# Patient Record
Sex: FEMALE | Race: WHITE | HISPANIC OR LATINO | Employment: FULL TIME | URBAN - METROPOLITAN AREA
[De-identification: names, ages, dates, MRNs, and addresses within clinical notes are randomized per-mention and may not be internally consistent; named-entity substitution may affect disease eponyms.]

---

## 2017-04-25 LAB
EXTERNAL HIV CONFIRMATION: NORMAL
EXTERNAL HIV SCREEN: NORMAL
HCV AB SER-ACNC: NEGATIVE

## 2018-11-17 ENCOUNTER — APPOINTMENT (EMERGENCY)
Dept: RADIOLOGY | Facility: HOSPITAL | Age: 28
End: 2018-11-17
Payer: COMMERCIAL

## 2018-11-17 ENCOUNTER — HOSPITAL ENCOUNTER (EMERGENCY)
Facility: HOSPITAL | Age: 28
Discharge: HOME | End: 2018-11-17
Attending: EMERGENCY MEDICINE | Admitting: EMERGENCY MEDICINE
Payer: COMMERCIAL

## 2018-11-17 VITALS
TEMPERATURE: 97.5 F | SYSTOLIC BLOOD PRESSURE: 118 MMHG | DIASTOLIC BLOOD PRESSURE: 70 MMHG | WEIGHT: 146 LBS | OXYGEN SATURATION: 97 % | HEART RATE: 78 BPM | RESPIRATION RATE: 18 BRPM

## 2018-11-17 DIAGNOSIS — J40 BRONCHITIS: Primary | ICD-10-CM

## 2018-11-17 PROCEDURE — 71046 X-RAY EXAM CHEST 2 VIEWS: CPT

## 2018-11-17 PROCEDURE — 99285 EMERGENCY DEPT VISIT HI MDM: CPT

## 2018-11-17 PROCEDURE — 94640 AIRWAY INHALATION TREATMENT: CPT

## 2018-11-17 RX ORDER — ALBUTEROL SULFATE 90 UG/1
2 AEROSOL, METERED RESPIRATORY (INHALATION) EVERY 4 HOURS PRN
Qty: 1 INHALER | Refills: 0 | Status: SHIPPED | OUTPATIENT
Start: 2018-11-17 | End: 2019-03-29

## 2018-11-17 RX ORDER — ALBUTEROL SULFATE 2.5 MG/3ML
SOLUTION RESPIRATORY (INHALATION)
Status: COMPLETED
Start: 2018-11-17 | End: 2018-11-17

## 2018-11-17 RX ORDER — PREDNISONE 10 MG/1
TABLET ORAL
Qty: 27 TABLET | Refills: 0 | Status: SHIPPED | OUTPATIENT
Start: 2018-11-17 | End: 2019-03-29

## 2018-11-17 RX ORDER — CETIRIZINE HYDROCHLORIDE 10 MG/1
10 TABLET ORAL DAILY
Qty: 30 TABLET | Refills: 0 | Status: SHIPPED | OUTPATIENT
Start: 2018-11-17 | End: 2019-03-29 | Stop reason: SDUPTHER

## 2018-11-17 RX ORDER — ALBUTEROL SULFATE 2.5 MG/3ML
5 SOLUTION RESPIRATORY (INHALATION) ONCE
Status: COMPLETED | OUTPATIENT
Start: 2018-11-17 | End: 2018-11-17

## 2018-11-17 RX ORDER — ALBUTEROL SULFATE 90 UG/1
2 AEROSOL, METERED RESPIRATORY (INHALATION) EVERY 6 HOURS PRN
COMMUNITY
End: 2019-03-29

## 2018-11-17 RX ORDER — PREDNISONE 10 MG/1
5 TABLET ORAL DAILY
COMMUNITY
End: 2018-11-17

## 2018-11-17 RX ORDER — FLUTICASONE PROPIONATE 50 MCG
1 SPRAY, SUSPENSION (ML) NASAL DAILY
Qty: 16 G | Refills: 0 | Status: SHIPPED | OUTPATIENT
Start: 2018-11-17 | End: 2019-07-18

## 2018-11-17 RX ADMIN — ALBUTEROL SULFATE 5 MG: 2.5 SOLUTION RESPIRATORY (INHALATION) at 10:45

## 2018-11-17 RX ADMIN — IPRATROPIUM BROMIDE 0.5 MG: 0.5 SOLUTION RESPIRATORY (INHALATION) at 10:45

## 2018-11-17 RX ADMIN — Medication 0.5 MG: at 10:45

## 2018-11-17 RX ADMIN — PREDNISONE 50 MG: 20 TABLET ORAL at 11:22

## 2018-11-17 NOTE — ED PROVIDER NOTES
History  Chief Complaint   Patient presents with    Shortness of Breath     cough x 1 month, treated for bronchitis and sob since this morning  Denies cp      26 yo female first started with cough, wheezing, URI symptoms about 1 month ago, evaluated at [facility] near onset, treated with abx, steroids, benzonatate, and had 3 week period of interval improvement, then exacerbated again at work yesterday, which is a typical trigger due to jorge a environment where she does wear a mask at times, but not all the time  She has not been previously diagnosed with asthma  History provided by:  Patient  URI   Presenting symptoms: cough    Presenting symptoms: no fever and no sore throat    Severity:  Moderate  Onset quality:  Sudden  Duration:  2 days  Timing:  Sporadic  Progression:  Worsening  Chronicity:  Recurrent  Relieved by:  Nebulizer treatments  Worsened by:  Breathing  Ineffective treatments:  OTC medications  Associated symptoms: wheezing    Associated symptoms: no headaches and no neck pain    Risk factors: no sick contacts        Prior to Admission Medications   Prescriptions Last Dose Informant Patient Reported? Taking? albuterol (PROVENTIL HFA,VENTOLIN HFA) 90 mcg/act inhaler   Yes Yes   Sig: Inhale 2 puffs every 6 (six) hours as needed for wheezing   predniSONE 10 mg tablet   Yes Yes   Sig: Take 5 mg by mouth daily      Facility-Administered Medications: None       History reviewed  No pertinent past medical history  History reviewed  No pertinent surgical history  History reviewed  No pertinent family history  I have reviewed and agree with the history as documented  Social History   Substance Use Topics    Smoking status: Never Smoker    Smokeless tobacco: Never Used    Alcohol use Yes      Comment: social        Review of Systems   Constitutional: Negative for appetite change, chills and fever  HENT: Negative for sore throat      Respiratory: Positive for cough and wheezing  Negative for shortness of breath  Cardiovascular: Negative for chest pain and palpitations  Gastrointestinal: Negative for abdominal pain, diarrhea, nausea and vomiting  Genitourinary: Negative for dysuria and hematuria  Musculoskeletal: Negative for neck pain  Skin: Negative for rash  Neurological: Negative for dizziness, weakness and headaches  Psychiatric/Behavioral: Negative for suicidal ideas  All other systems reviewed and are negative  Physical Exam  Physical Exam   Constitutional: She is oriented to person, place, and time  Vital signs are normal  She appears well-developed and well-nourished  Non-toxic appearance  First nurse protocol gave updraft treatment and she reports improvement   HENT:   Head: Normocephalic and atraumatic  Right Ear: Tympanic membrane and external ear normal    Left Ear: Tympanic membrane and external ear normal    Nose: Nose normal    Mouth/Throat: Oropharynx is clear and moist    Eyes: Pupils are equal, round, and reactive to light  Conjunctivae and EOM are normal    Neck: Normal range of motion and full passive range of motion without pain  Neck supple  No Brudzinski's sign and no Kernig's sign noted  Cardiovascular: Normal rate, regular rhythm, normal heart sounds, intact distal pulses and normal pulses  No murmur heard  Pulmonary/Chest: Effort normal and breath sounds normal  No tachypnea  No respiratory distress  She has no wheezes  Abdominal: Soft  Bowel sounds are normal  She exhibits no distension  There is no tenderness  There is no rigidity, no rebound and no guarding  Musculoskeletal: Normal range of motion  Right lower leg: She exhibits no swelling  Left lower leg: She exhibits no swelling  Lymphadenopathy:     She has no cervical adenopathy  Neurological: She is alert and oriented to person, place, and time  She has normal strength and normal reflexes  No cranial nerve deficit or sensory deficit  Coordination and gait normal  GCS eye subscore is 4  GCS verbal subscore is 5  GCS motor subscore is 6  Skin: Skin is warm and dry  No rash noted  She is not diaphoretic  No pallor  Psychiatric: She has a normal mood and affect  Her speech is normal and behavior is normal  Judgment and thought content normal  Cognition and memory are normal    Nursing note and vitals reviewed        Vital Signs  ED Triage Vitals   Temperature Pulse Respirations Blood Pressure SpO2   11/17/18 1027 11/17/18 1027 11/17/18 1027 11/17/18 1027 11/17/18 1027   97 5 °F (36 4 °C) 82 22 135/74 100 %      Temp Source Heart Rate Source Patient Position - Orthostatic VS BP Location FiO2 (%)   11/17/18 1027 11/17/18 1144 11/17/18 1144 11/17/18 1144 --   Temporal Monitor Sitting Right arm       Pain Score       11/17/18 1027       No Pain           Vitals:    11/17/18 1027 11/17/18 1144   BP: 135/74 118/70   Pulse: 82 78   Patient Position - Orthostatic VS:  Sitting       Visual Acuity      ED Medications  Medications   albuterol inhalation solution 5 mg (5 mg Nebulization Given 11/17/18 1045)   ipratropium (ATROVENT) 0 02 % inhalation solution 0 5 mg (0 5 mg Nebulization Given 11/17/18 1045)   predniSONE tablet 50 mg (50 mg Oral Given 11/17/18 1122)       Diagnostic Studies  Results Reviewed     None                 XR chest 2 views   ED Interpretation by Karmen Bose MD (11/17 1150)   normal                 Procedures  Procedures       Phone Contacts  ED Phone Contact    ED Course  ED Course as of Nov 17 1156   Sat Nov 17, 2018   1150 normal XR chest 2 views                               MDM  CritCare Time    Disposition  Final diagnoses:   Bronchitis     Time reflects when diagnosis was documented in both MDM as applicable and the Disposition within this note     Time User Action Codes Description Comment    11/17/2018 11:55 AM Lizz Goodman Add [J40] Bronchitis       ED Disposition     ED Disposition Condition Comment Discharge  [facility] discharge to home/self care  Condition at discharge: Good        Follow-up Information     Follow up With Specialties Details Why Contact Info    Susan Guzmán MD Internal Medicine Schedule an appointment as soon as possible for a visit For followup [address] [facility] [address]  [phone]            Patient's Medications   Discharge Prescriptions    ALBUTEROL (PROVENTIL HFA,VENTOLIN HFA) 90 MCG/ACT INHALER    Inhale 2 puffs every 4 (four) hours as needed for wheezing (or cough)       Start Date: 11/17/2018End Date: --       Order Dose: 2 puffs       Quantity: 1 Inhaler    Refills: 0    CETIRIZINE (ZYRTEC) 10 MG TABLET    Take 1 tablet (10 mg total) by mouth daily       Start Date: 11/17/2018End Date: 11/17/2019       Order Dose: 10 mg       Quantity: 30 tablet    Refills: 0    DEXTROMETHORPHAN-GUAIFENESIN (MUCINEX DM)  MG PER 12 HR TABLET    Take 1 tablet by mouth every 12 (twelve) hours as needed for cough       Start Date: 11/17/2018End Date: --       Order Dose: 1 tablet       Quantity: 10 tablet    Refills: 0    FLUTICASONE (FLONASE) 50 MCG/ACT NASAL SPRAY    1 spray into each nostril daily       Start Date: 11/17/2018End Date: 11/17/2019       Order Dose: 1 spray       Quantity: 16 g    Refills: 0    PREDNISONE 10 MG TABLET    START 4 po daily on days 1-5  Then 3 po on Day 6   2 po on Day 7   1 po on Day 8   1/2 po on Day 9 and 10  STOP       Start Date: 11/17/2018End Date: --       Order Dose: --       Quantity: 27 tablet    Refills: 0     No discharge procedures on file      ED Provider  Electronically Signed by           Aparna Wilson MD  11/17/18 6854

## 2019-03-29 ENCOUNTER — OFFICE VISIT (OUTPATIENT)
Dept: FAMILY MEDICINE CLINIC | Facility: CLINIC | Age: 29
End: 2019-03-29
Payer: COMMERCIAL

## 2019-03-29 VITALS
HEART RATE: 77 BPM | RESPIRATION RATE: 12 BRPM | HEIGHT: 62 IN | BODY MASS INDEX: 28.16 KG/M2 | SYSTOLIC BLOOD PRESSURE: 104 MMHG | WEIGHT: 153 LBS | OXYGEN SATURATION: 98 % | DIASTOLIC BLOOD PRESSURE: 74 MMHG

## 2019-03-29 DIAGNOSIS — J45.50 SEVERE PERSISTENT ASTHMA WITHOUT COMPLICATION: ICD-10-CM

## 2019-03-29 DIAGNOSIS — J40 BRONCHITIS: ICD-10-CM

## 2019-03-29 DIAGNOSIS — J30.1 ALLERGIC RHINITIS DUE TO POLLEN, UNSPECIFIED SEASONALITY: ICD-10-CM

## 2019-03-29 DIAGNOSIS — Z23 ENCOUNTER FOR VACCINATION: Primary | ICD-10-CM

## 2019-03-29 PROBLEM — J30.9 ALLERGIC RHINITIS: Status: ACTIVE | Noted: 2019-03-29

## 2019-03-29 PROCEDURE — 99203 OFFICE O/P NEW LOW 30 MIN: CPT | Performed by: FAMILY MEDICINE

## 2019-03-29 RX ORDER — CETIRIZINE HYDROCHLORIDE 10 MG/1
10 TABLET ORAL DAILY
Qty: 90 TABLET | Refills: 1 | Status: SHIPPED | OUTPATIENT
Start: 2019-03-29 | End: 2019-12-08 | Stop reason: SDUPTHER

## 2019-03-29 RX ORDER — ALBUTEROL SULFATE 90 UG/1
2 AEROSOL, METERED RESPIRATORY (INHALATION) EVERY 6 HOURS PRN
COMMUNITY
Start: 2019-01-22 | End: 2019-03-29 | Stop reason: SDUPTHER

## 2019-03-29 RX ORDER — PREDNISONE 20 MG/1
40 TABLET ORAL DAILY
Qty: 10 TABLET | Refills: 0 | Status: SHIPPED | OUTPATIENT
Start: 2019-03-29 | End: 2020-10-01

## 2019-03-29 RX ORDER — ALBUTEROL SULFATE 90 UG/1
2 AEROSOL, METERED RESPIRATORY (INHALATION) EVERY 4 HOURS PRN
Qty: 1 INHALER | Refills: 0 | Status: SHIPPED | OUTPATIENT
Start: 2019-03-29 | End: 2019-06-03 | Stop reason: SDUPTHER

## 2019-03-29 RX ORDER — MONTELUKAST SODIUM 10 MG/1
10 TABLET ORAL
Qty: 90 TABLET | Refills: 1 | Status: SHIPPED | OUTPATIENT
Start: 2019-03-29 | End: 2019-10-15 | Stop reason: SDUPTHER

## 2019-03-29 NOTE — PROGRESS NOTES
Assessment/Plan:    53-year-old woman with:  Severe persistent asthma and allergic rhinitis  Discussed treatment options with risks and benefits  Will give steroid burst and continue other medications and add Singulair as well  Encouraged her to always use the spacer with her inhalers as well  Will follow up in 1 month but advised to call back if not improving worsening before then    No problem-specific Assessment & Plan notes found for this encounter  Diagnoses and all orders for this visit:    Encounter for vaccination    Severe persistent asthma without complication  -     predniSONE 20 mg tablet; Take 2 tablets (40 mg total) by mouth daily  -     albuterol (PROAIR HFA) 90 mcg/act inhaler; Inhale 2 puffs every 4 (four) hours as needed for wheezing  -     montelukast (SINGULAIR) 10 mg tablet; Take 1 tablet (10 mg total) by mouth daily at bedtime  -     Spacer Device for Inhaler    Allergic rhinitis due to pollen, unspecified seasonality  -     Spacer Device for Inhaler    Bronchitis  -     cetirizine (ZyrTEC) 10 mg tablet; Take 1 tablet (10 mg total) by mouth daily    Other orders  -     Cancel: Tdap vaccine greater than or equal to 8yo IM  -     Discontinue: albuterol (PROAIR HFA) 90 mcg/act inhaler; Inhale 2 puffs every 6 (six) hours as needed  -     ADVAIR DISKUS 100-50 MCG/DOSE inhaler; Inhale 1 puff every 12 (twelve) hours          Subjective:     Chief Complaint   Patient presents with   [address]     Patient presents to the office today to establish for care with c/o SOB and wheezing at night   Shortness of Breath    Wheezing        Patient ID: Star Lance is a 30 y o  female  Patient is a 53-year-old woman who presents to establish care in this practice  She has a history of allergic rhinitis and severe persistent asthma admits she has been generally poorly controlled  No fevers chills nausea vomiting  No productive sputum  Tolerating p o   Intake      The following portions of the patient's history were reviewed and updated as appropriate: allergies, current medications, past family history, past medical history, past social history, past surgical history and problem list     Review of Systems   Constitutional: Negative  HENT: Positive for congestion  Eyes: Negative  Respiratory: Positive for cough and wheezing  Cardiovascular: Negative  Gastrointestinal: Negative  Endocrine: Negative  Genitourinary: Negative  Musculoskeletal: Negative  Allergic/Immunologic: Negative  Neurological: Negative  Hematological: Negative  Psychiatric/Behavioral: Negative  All other systems reviewed and are negative  Objective:      /74 (BP Location: Right arm, Patient Position: Sitting, Cuff Size: Adult)   Pulse 77   Resp 12   Ht 5' 1 5" (1 562 m)   Wt 69 4 kg (153 lb)   SpO2 98%   BMI 28 44 kg/m²          Physical Exam   Constitutional: She is oriented to person, place, and time  She appears well-developed and well-nourished  HENT:   Head: Atraumatic  Right Ear: External ear normal    Left Ear: External ear normal    Eyes: Pupils are equal, round, and reactive to light  Conjunctivae and EOM are normal    Neck: Normal range of motion  Cardiovascular: Normal rate, regular rhythm and normal heart sounds  Pulmonary/Chest: Effort normal  No respiratory distress  She has wheezes  Abdominal: Soft  Bowel sounds are normal  She exhibits no distension  There is no tenderness  There is no rebound and no guarding  Musculoskeletal: Normal range of motion  Neurological: She is alert and oriented to person, place, and time  No cranial nerve deficit  Skin: Skin is warm and dry  Psychiatric: She has a normal mood and affect   Her behavior is normal  Judgment and thought content normal

## 2019-04-03 ENCOUNTER — HOSPITAL ENCOUNTER (EMERGENCY)
Facility: HOSPITAL | Age: 29
Discharge: HOME | End: 2019-04-03
Attending: EMERGENCY MEDICINE
Payer: COMMERCIAL

## 2019-04-03 VITALS
SYSTOLIC BLOOD PRESSURE: 134 MMHG | TEMPERATURE: 97.9 F | BODY MASS INDEX: 28.69 KG/M2 | HEART RATE: 76 BPM | OXYGEN SATURATION: 99 % | WEIGHT: 154.32 LBS | RESPIRATION RATE: 16 BRPM | DIASTOLIC BLOOD PRESSURE: 84 MMHG

## 2019-04-03 DIAGNOSIS — M79.602 LEFT ARM PAIN: ICD-10-CM
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
## 2019-04-03 PROCEDURE — 99284 EMERGENCY DEPT VISIT MOD MDM: CPT | Performed by: PHYSICIAN ASSISTANT

## 2019-04-03 PROCEDURE — 99284 EMERGENCY DEPT VISIT MOD MDM: CPT

## 2019-04-03 RX ORDER — DIAZEPAM 5 MG/1
5 TABLET ORAL 2 TIMES DAILY
Qty: 10 TABLET | Refills: 0 | Status: SHIPPED | OUTPATIENT
Start: 2019-04-03 | End: 2019-07-18

## 2019-04-03 RX ORDER — NAPROXEN 500 MG/1
500 TABLET ORAL 2 TIMES DAILY WITH MEALS
Qty: 30 TABLET | Refills: 0 | Status: SHIPPED | OUTPATIENT
Start: 2019-04-03 | End: 2020-10-01

## 2019-04-11 ENCOUNTER — OFFICE VISIT (OUTPATIENT)
Dept: URGENT CARE | Age: 29
End: 2019-04-11
Payer: COMMERCIAL

## 2019-04-11 VITALS
RESPIRATION RATE: 16 BRPM | WEIGHT: 152 LBS | BODY MASS INDEX: 28.7 KG/M2 | HEART RATE: 108 BPM | OXYGEN SATURATION: 100 % | HEIGHT: 61 IN | TEMPERATURE: 99 F | SYSTOLIC BLOOD PRESSURE: 118 MMHG | DIASTOLIC BLOOD PRESSURE: 77 MMHG

## 2019-04-11 DIAGNOSIS — J06.9 UPPER RESPIRATORY TRACT INFECTION, UNSPECIFIED TYPE: Primary | ICD-10-CM

## 2019-04-11 PROCEDURE — G0382 LEV 3 HOSP TYPE B ED VISIT: HCPCS | Performed by: PHYSICIAN ASSISTANT

## 2019-04-12 ENCOUNTER — APPOINTMENT (OUTPATIENT)
Dept: RADIOLOGY | Facility: MEDICAL CENTER | Age: 29
End: 2019-04-12
Payer: COMMERCIAL

## 2019-04-12 ENCOUNTER — OFFICE VISIT (OUTPATIENT)
Dept: FAMILY MEDICINE CLINIC | Facility: CLINIC | Age: 29
End: 2019-04-12
Payer: COMMERCIAL

## 2019-04-12 VITALS
WEIGHT: 153 LBS | HEART RATE: 101 BPM | SYSTOLIC BLOOD PRESSURE: 124 MMHG | DIASTOLIC BLOOD PRESSURE: 68 MMHG | BODY MASS INDEX: 28.89 KG/M2 | OXYGEN SATURATION: 99 % | HEIGHT: 61 IN

## 2019-04-12 DIAGNOSIS — M54.6 ACUTE MIDLINE THORACIC BACK PAIN: Primary | ICD-10-CM

## 2019-04-12 DIAGNOSIS — M54.6 ACUTE MIDLINE THORACIC BACK PAIN: ICD-10-CM

## 2019-04-12 PROCEDURE — 99213 OFFICE O/P EST LOW 20 MIN: CPT | Performed by: FAMILY MEDICINE

## 2019-04-12 PROCEDURE — 72072 X-RAY EXAM THORAC SPINE 3VWS: CPT

## 2019-06-03 DIAGNOSIS — J45.50 SEVERE PERSISTENT ASTHMA WITHOUT COMPLICATION: ICD-10-CM

## 2019-06-03 RX ORDER — ALBUTEROL SULFATE 90 UG/1
2 AEROSOL, METERED RESPIRATORY (INHALATION) EVERY 4 HOURS PRN
Qty: 1 INHALER | Refills: 0 | Status: CANCELLED | OUTPATIENT
Start: 2019-06-03 | End: 2020-06-02

## 2019-06-05 RX ORDER — ALBUTEROL SULFATE 90 UG/1
2 AEROSOL, METERED RESPIRATORY (INHALATION) EVERY 4 HOURS PRN
Qty: 1 INHALER | Refills: 1 | Status: SHIPPED | OUTPATIENT
Start: 2019-06-05 | End: 2019-08-01 | Stop reason: SDUPTHER

## 2019-07-18 ENCOUNTER — OFFICE VISIT (OUTPATIENT)
Dept: FAMILY MEDICINE CLINIC | Facility: CLINIC | Age: 29
End: 2019-07-18
Payer: COMMERCIAL

## 2019-07-18 ENCOUNTER — HOSPITAL ENCOUNTER (OUTPATIENT)
Dept: RADIOLOGY | Facility: HOSPITAL | Age: 29
Discharge: HOME | End: 2019-07-18
Payer: COMMERCIAL

## 2019-07-18 VITALS
SYSTOLIC BLOOD PRESSURE: 116 MMHG | OXYGEN SATURATION: 98 % | BODY MASS INDEX: 28.53 KG/M2 | TEMPERATURE: 98.4 F | WEIGHT: 151 LBS | DIASTOLIC BLOOD PRESSURE: 74 MMHG | HEART RATE: 65 BPM

## 2019-07-18 DIAGNOSIS — M25.561 ACUTE PAIN OF RIGHT KNEE: Primary | ICD-10-CM

## 2019-07-18 PROCEDURE — 1036F TOBACCO NON-USER: CPT | Performed by: FAMILY MEDICINE

## 2019-07-18 PROCEDURE — 99213 OFFICE O/P EST LOW 20 MIN: CPT | Performed by: FAMILY MEDICINE

## 2019-07-18 PROCEDURE — 73564 X-RAY EXAM KNEE 4 OR MORE: CPT

## 2019-07-18 NOTE — PROGRESS NOTES
Assessment/Plan:    18-year-old woman with:  Right knee pain  Discussed heat and cold stretching and anti-inflammatories along with bracing  If not improving over the next week or so it will encouraged her to see Physical therapy  Will check x-ray as well and advised to call back if not improving or worsening    No problem-specific Assessment & Plan notes found for this encounter  Diagnoses and all orders for this visit:    Acute pain of right knee  -     XR knee 4+ vw right injury; Future  -     Ambulatory referral to Physical Therapy; Future          Subjective:     Chief Complaint   Patient presents with    Knee Injury     hurt while standing at work, turned wrong, felt tightness        Patient ID: Dian Dugan is a 30 y o  female  Patient is a 18-year-old woman who presents complaining of acute onset of right knee pain today but says that she has had intermittent right knee pain when using her bike over the past several weeks  No fevers chills nausea vomiting  No weakness numbness or tingling  No specific precipitating trauma  The following portions of the patient's history were reviewed and updated as appropriate: allergies, current medications, past family history, past medical history, past social history, past surgical history and problem list     Review of Systems   Constitutional: Negative  HENT: Negative  Eyes: Negative  Respiratory: Negative  Cardiovascular: Negative  Gastrointestinal: Negative  Endocrine: Negative  Genitourinary: Negative  Musculoskeletal: Positive for arthralgias  Allergic/Immunologic: Negative  Neurological: Negative  Hematological: Negative  Psychiatric/Behavioral: Negative  All other systems reviewed and are negative          Objective:      /74   Pulse 65   Temp 98 4 °F (36 9 °C)   Wt 68 5 kg (151 lb)   SpO2 98%   BMI 28 53 kg/m²          Physical Exam   Constitutional: She is oriented to person, place, and time  She appears well-developed and well-nourished  HENT:   Head: Atraumatic  Right Ear: External ear normal    Left Ear: External ear normal    Eyes: Pupils are equal, round, and reactive to light  Conjunctivae and EOM are normal    Neck: Normal range of motion  Pulmonary/Chest: Effort normal  No respiratory distress  Abdominal: She exhibits no distension  Musculoskeletal: Normal range of motion  Right palpable tenderness anteriorly with positive patellar grind apprehension testing  Neurological: She is alert and oriented to person, place, and time  No cranial nerve deficit  Skin: Skin is warm and dry  Psychiatric: She has a normal mood and affect   Her behavior is normal  Judgment and thought content normal

## 2019-07-22 ENCOUNTER — TELEPHONE (OUTPATIENT)
Dept: FAMILY MEDICINE CLINIC | Facility: CLINIC | Age: 29
End: 2019-07-22

## 2019-07-22 NOTE — TELEPHONE ENCOUNTER
Patient called requesting a note to be released to go back to work today since her xray results came back normal

## 2019-08-01 DIAGNOSIS — J45.50 SEVERE PERSISTENT ASTHMA WITHOUT COMPLICATION: ICD-10-CM

## 2019-08-01 RX ORDER — ALBUTEROL SULFATE 90 UG/1
AEROSOL, METERED RESPIRATORY (INHALATION)
Qty: 8.5 INHALER | Refills: 1 | Status: SHIPPED | OUTPATIENT
Start: 2019-08-01 | End: 2019-12-23 | Stop reason: SDUPTHER

## 2019-10-08 ENCOUNTER — OFFICE VISIT (OUTPATIENT)
Dept: FAMILY MEDICINE CLINIC | Facility: CLINIC | Age: 29
End: 2019-10-08
Payer: COMMERCIAL

## 2019-10-08 VITALS
HEART RATE: 70 BPM | DIASTOLIC BLOOD PRESSURE: 62 MMHG | TEMPERATURE: 98.9 F | OXYGEN SATURATION: 97 % | BODY MASS INDEX: 28.32 KG/M2 | WEIGHT: 150 LBS | SYSTOLIC BLOOD PRESSURE: 124 MMHG | HEIGHT: 61 IN

## 2019-10-08 DIAGNOSIS — Z00.00 ROUTINE ADULT HEALTH MAINTENANCE: ICD-10-CM

## 2019-10-08 DIAGNOSIS — Z01.419 ENCOUNTER FOR ANNUAL ROUTINE GYNECOLOGICAL EXAMINATION: ICD-10-CM

## 2019-10-08 DIAGNOSIS — N30.01 ACUTE CYSTITIS WITH HEMATURIA: ICD-10-CM

## 2019-10-08 DIAGNOSIS — N39.0 URINARY TRACT INFECTION WITHOUT HEMATURIA, SITE UNSPECIFIED: Primary | ICD-10-CM

## 2019-10-08 DIAGNOSIS — J45.50 SEVERE PERSISTENT ASTHMA WITHOUT COMPLICATION: ICD-10-CM

## 2019-10-08 LAB
SL AMB  POCT GLUCOSE, UA: NORMAL
SL AMB LEUKOCYTE ESTERASE,UA: ABNORMAL
SL AMB POCT BILIRUBIN,UA: ABNORMAL
SL AMB POCT BLOOD,UA: ABNORMAL
SL AMB POCT CLARITY,UA: CLEAR
SL AMB POCT COLOR,UA: YELLOW
SL AMB POCT KETONES,UA: ABNORMAL
SL AMB POCT NITRITE,UA: ABNORMAL
SL AMB POCT PH,UA: 5
SL AMB POCT SPECIFIC GRAVITY,UA: 1.01
SL AMB POCT URINE PROTEIN: 30
SL AMB POCT UROBILINOGEN: ABNORMAL

## 2019-10-08 PROCEDURE — 87086 URINE CULTURE/COLONY COUNT: CPT | Performed by: FAMILY MEDICINE

## 2019-10-08 PROCEDURE — 87077 CULTURE AEROBIC IDENTIFY: CPT | Performed by: FAMILY MEDICINE

## 2019-10-08 PROCEDURE — 87186 SC STD MICRODIL/AGAR DIL: CPT | Performed by: FAMILY MEDICINE

## 2019-10-08 PROCEDURE — 99213 OFFICE O/P EST LOW 20 MIN: CPT | Performed by: FAMILY MEDICINE

## 2019-10-08 PROCEDURE — 99395 PREV VISIT EST AGE 18-39: CPT | Performed by: FAMILY MEDICINE

## 2019-10-08 PROCEDURE — 81002 URINALYSIS NONAUTO W/O SCOPE: CPT | Performed by: FAMILY MEDICINE

## 2019-10-08 RX ORDER — PHENAZOPYRIDINE HYDROCHLORIDE 100 MG/1
100 TABLET, FILM COATED ORAL 3 TIMES DAILY PRN
Qty: 10 TABLET | Refills: 0 | Status: SHIPPED | OUTPATIENT
Start: 2019-10-08 | End: 2019-10-31 | Stop reason: ALTCHOICE

## 2019-10-08 RX ORDER — NITROFURANTOIN 25; 75 MG/1; MG/1
100 CAPSULE ORAL 2 TIMES DAILY
Qty: 10 CAPSULE | Refills: 0 | Status: SHIPPED | OUTPATIENT
Start: 2019-10-08 | End: 2019-10-13

## 2019-10-08 RX ORDER — BUDESONIDE AND FORMOTEROL FUMARATE DIHYDRATE 160; 4.5 UG/1; UG/1
2 AEROSOL RESPIRATORY (INHALATION) 2 TIMES DAILY
Qty: 1 INHALER | Refills: 0 | Status: SHIPPED | OUTPATIENT
Start: 2019-10-08 | End: 2019-10-30 | Stop reason: SDUPTHER

## 2019-10-09 ENCOUNTER — OFFICE VISIT (OUTPATIENT)
Dept: OBGYN CLINIC | Facility: CLINIC | Age: 29
End: 2019-10-09
Payer: COMMERCIAL

## 2019-10-09 VITALS
HEIGHT: 61 IN | DIASTOLIC BLOOD PRESSURE: 84 MMHG | SYSTOLIC BLOOD PRESSURE: 112 MMHG | BODY MASS INDEX: 28.47 KG/M2 | WEIGHT: 150.8 LBS

## 2019-10-09 DIAGNOSIS — B96.89 BV (BACTERIAL VAGINOSIS): ICD-10-CM

## 2019-10-09 DIAGNOSIS — N76.0 BV (BACTERIAL VAGINOSIS): ICD-10-CM

## 2019-10-09 DIAGNOSIS — N89.8 VAGINAL DISCHARGE: ICD-10-CM

## 2019-10-09 DIAGNOSIS — Z01.419 WOMEN'S ANNUAL ROUTINE GYNECOLOGICAL EXAMINATION: Primary | ICD-10-CM

## 2019-10-09 PROBLEM — Z00.00 ROUTINE ADULT HEALTH MAINTENANCE: Status: ACTIVE | Noted: 2019-10-09

## 2019-10-09 PROCEDURE — 87210 SMEAR WET MOUNT SALINE/INK: CPT | Performed by: OBSTETRICS & GYNECOLOGY

## 2019-10-09 PROCEDURE — 99395 PREV VISIT EST AGE 18-39: CPT | Performed by: OBSTETRICS & GYNECOLOGY

## 2019-10-09 RX ORDER — METRONIDAZOLE 500 MG/1
500 TABLET ORAL EVERY 12 HOURS SCHEDULED
Qty: 14 TABLET | Refills: 0 | Status: SHIPPED | OUTPATIENT
Start: 2019-10-09 | End: 2019-10-16

## 2019-10-09 NOTE — PROGRESS NOTES
Assessment/Plan:    78-year-old woman with:  Acute cystitis and asthma  Discussed treatment options with risks and benefits  Will empirically treat with Macrobid and give Pyridium to use as needed  Will send urine for culture and continue Symbicort advised to call back if not improving or worsening    No problem-specific Assessment & Plan notes found for this encounter  Diagnoses and all orders for this visit:    Urinary tract infection without hematuria, site unspecified  -     POCT urine dip    Acute cystitis with hematuria  -     nitrofurantoin (MACROBID) 100 mg capsule; Take 1 capsule (100 mg total) by mouth 2 (two) times a day for 5 days  -     phenazopyridine (PYRIDIUM) 100 mg tablet; Take 1 tablet (100 mg total) by mouth 3 (three) times a day as needed for bladder spasms  -     Urine culture; Future  -     Urine culture    Encounter for annual routine gynecological examination  -     Ambulatory referral to Obstetrics / Gynecology; Future    Severe persistent asthma without complication  -     budesonide-formoterol (SYMBICORT) 160-4 5 mcg/act inhaler; Inhale 2 puffs 2 (two) times a day Rinse mouth after use  Subjective:      Patient ID: Cami Bailey is a 30 y o  female  Patient is a 78-year-old woman who presents complaining of some urinary frequency and burning over the past several days  No fevers chills nausea vomiting  Tolerating p o  Intake  No flank pain  She also has asthma requests refill of a controller inhaler to help as the rescue is not sufficient  The following portions of the patient's history were reviewed and updated as appropriate: allergies, current medications, past family history, past medical history, past social history, past surgical history and problem list     Review of Systems   Constitutional: Negative  HENT: Negative  Eyes: Negative  Respiratory: Positive for cough and wheezing  Cardiovascular: Negative      Gastrointestinal: Negative  Endocrine: Negative  Genitourinary: Positive for dysuria and frequency  Musculoskeletal: Negative  Allergic/Immunologic: Negative  Neurological: Negative  Hematological: Negative  Psychiatric/Behavioral: Negative  All other systems reviewed and are negative  Objective:      /62 (BP Location: Right arm, Patient Position: Sitting, Cuff Size: Standard)   Pulse 70   Temp 98 9 °F (37 2 °C) (Tympanic)   Ht 5' 1" (1 549 m)   Wt 68 kg (150 lb)   LMP 09/08/2019 (Exact Date)   SpO2 97%   BMI 28 34 kg/m²          Physical Exam   Constitutional: She is oriented to person, place, and time  She appears well-developed and well-nourished  HENT:   Head: Atraumatic  Right Ear: External ear normal    Left Ear: External ear normal    Eyes: Pupils are equal, round, and reactive to light  Conjunctivae and EOM are normal    Neck: Normal range of motion  Cardiovascular: Normal rate, regular rhythm and normal heart sounds  Pulmonary/Chest: Effort normal and breath sounds normal  No respiratory distress  Abdominal: Soft  She exhibits no distension  There is no tenderness  There is no rebound and no guarding  Musculoskeletal: Normal range of motion  Neurological: She is alert and oriented to person, place, and time  No cranial nerve deficit  Skin: Skin is warm and dry  Psychiatric: She has a normal mood and affect  Her behavior is normal  Judgment and thought content normal          BMI Counseling: Body mass index is 28 34 kg/m²  Discussed the patient's BMI with her  The BMI is above normal  Nutrition recommendations include 3-5 servings of fruits/vegetables daily  Exercise recommendations include moderate aerobic physical activity for 150 minutes/week

## 2019-10-09 NOTE — PROGRESS NOTES
Assessment   Annual well-woman exam  Vaginal discharge  BV        Plan   Metronidazole for BV   All questions answered  Await pap smear results  Helio Carrillo is a 30 y o  female nulliparous who presents for annual exam  Periods are regular every 28-30 days, lasting 5 days  Dysmenorrhea:mild, occurring throughout menses  Cyclic symptoms include none  No intermenstrual bleeding, spotting, or discharge  The patient reports that there is not domestic violence in her life  Current contraception: none  History of abnormal Pap smear: no  Family history of uterine or ovarian cancer: no  Regular self breast exam: yes  History of abnormal mammogram: no  Family history of breast cancer: no  History of abnormal lipids: no  Menstrual History:  OB History        0    Para   0    Term   0       0    AB   0    Living   0       SAB   0    TAB   0    Ectopic   0    Multiple   0    Live Births   0                Menarche age: 13  Patient's last menstrual period was 2019  Review of Systems  Pertinent items are noted in HPI        Objective no acute distress   /84 (BP Location: Left arm, Patient Position: Sitting, Cuff Size: Standard)   Ht 5' 1" (1 549 m)   Wt 68 4 kg (150 lb 12 8 oz)   LMP 2019   BMI 28 49 kg/m²     General:   alert and oriented, in no acute distress, alert, appears stated age and cooperative   Heart: regular rate and rhythm, S1, S2 normal, no murmur, click, rub or gallop   Lungs: clear to auscultation bilaterally   Abdomen: soft, non-tender, without masses or organomegaly   Vulva: normal, Bartholin's, Urethra, [employer]'s normal, female escutcheon   Vagina: normal mucosa, thin grey discharge, wet prep done   Cervix: no bleeding following Pap, no cervical motion tenderness, no lesions and nulliparous appearance   Uterus: normal size, mobile, non-tender, normal shape and consistency   Adnexa: normal adnexa and no mass, fullness, tenderness   Bilateral breast exam in the sitting and supine position with chaperone present, no visible or palpable breast lesions identified  No breast masses noted  No supraclavicular or axillary lymphadenopathy noted  No nipple discharge  Reviewed self-breast exam techniques     Rectal exam,   deferred

## 2019-10-09 NOTE — PROGRESS NOTES
Assessment/Plan:    18-year-old woman with: Annual well visit  Discussed various safety and health maintenance issues including healthy diet like the Mediterranean diet, exercise, healthy weight as tolerated, ample sleep and stress reduction strategies  Patient will be referred to gyn for an annual exam as well  No problem-specific Assessment & Plan notes found for this encounter  Diagnoses and all orders for this visit:    Urinary tract infection without hematuria, site unspecified  -     POCT urine dip    Acute cystitis with hematuria  -     nitrofurantoin (MACROBID) 100 mg capsule; Take 1 capsule (100 mg total) by mouth 2 (two) times a day for 5 days  -     phenazopyridine (PYRIDIUM) 100 mg tablet; Take 1 tablet (100 mg total) by mouth 3 (three) times a day as needed for bladder spasms  -     Urine culture; Future  -     Urine culture    Encounter for annual routine gynecological examination  -     Ambulatory referral to Obstetrics / Gynecology; Future    Severe persistent asthma without complication  -     budesonide-formoterol (SYMBICORT) 160-4 5 mcg/act inhaler; Inhale 2 puffs 2 (two) times a day Rinse mouth after use  Routine adult health maintenance          Subjective:     Chief Complaint   Patient presents with    Urinary Tract Infection     for the past 2 days has had the urge to void naida the time and pain when voiding   Well Child     pt is willing to have well check wile here        Patient ID: Pedro Bhatti is a 30 y o  female  Patient is a 18-year-old woman who presents for an annual well visit  She admits being physically active in generally eats a healthy diet  She sleeps well    No other health maintenance complaints      The following portions of the patient's history were reviewed and updated as appropriate: allergies, current medications, past family history, past medical history, past social history, past surgical history and problem list     Review of Systems Constitutional: Negative  HENT: Negative  Eyes: Negative  Respiratory: Positive for cough  Cardiovascular: Negative  Gastrointestinal: Negative  Endocrine: Negative  Genitourinary: Positive for dysuria and frequency  Musculoskeletal: Negative  Allergic/Immunologic: Negative  Neurological: Negative  Hematological: Negative  Psychiatric/Behavioral: Negative  All other systems reviewed and are negative  Objective:      /62 (BP Location: Right arm, Patient Position: Sitting, Cuff Size: Standard)   Pulse 70   Temp 98 9 °F (37 2 °C) (Tympanic)   Ht 5' 1" (1 549 m)   Wt 68 kg (150 lb)   LMP 09/08/2019 (Exact Date)   SpO2 97%   BMI 28 34 kg/m²          Physical Exam   Constitutional: She is oriented to person, place, and time  She appears well-developed and well-nourished  HENT:   Head: Atraumatic  Right Ear: External ear normal    Left Ear: External ear normal    Eyes: Pupils are equal, round, and reactive to light  Conjunctivae and EOM are normal    Neck: Normal range of motion  Cardiovascular: Normal rate, regular rhythm and normal heart sounds  Pulmonary/Chest: Effort normal and breath sounds normal  No respiratory distress  Abdominal: Soft  She exhibits no distension  There is no tenderness  There is no rebound and no guarding  Musculoskeletal: Normal range of motion  Neurological: She is alert and oriented to person, place, and time  No cranial nerve deficit  Skin: Skin is warm and dry  Psychiatric: She has a normal mood and affect   Her behavior is normal  Judgment and thought content normal

## 2019-10-10 LAB — BACTERIA UR CULT: ABNORMAL

## 2019-10-14 LAB
CLINICAL INFO: ABNORMAL
CYTO CVX: ABNORMAL
DATE PREVIOUS BX: ABNORMAL
HPV E6+E7 MRNA CVX QL NAA+PROBE: DETECTED
LMP START DATE: ABNORMAL
SL AMB PREV. PAP:: ABNORMAL
SPECIMEN SOURCE CVX/VAG CYTO: ABNORMAL

## 2019-10-15 DIAGNOSIS — J45.50 SEVERE PERSISTENT ASTHMA WITHOUT COMPLICATION: ICD-10-CM

## 2019-10-15 RX ORDER — MONTELUKAST SODIUM 10 MG/1
10 TABLET ORAL
Qty: 30 TABLET | Refills: 5 | Status: SHIPPED | OUTPATIENT
Start: 2019-10-15 | End: 2020-04-21

## 2019-10-30 DIAGNOSIS — J45.50 SEVERE PERSISTENT ASTHMA WITHOUT COMPLICATION: ICD-10-CM

## 2019-10-30 RX ORDER — BUDESONIDE AND FORMOTEROL FUMARATE DIHYDRATE 160; 4.5 UG/1; UG/1
AEROSOL RESPIRATORY (INHALATION)
Qty: 10.2 INHALER | Refills: 0 | Status: SHIPPED | OUTPATIENT
Start: 2019-10-30 | End: 2020-10-01

## 2019-10-31 ENCOUNTER — OFFICE VISIT (OUTPATIENT)
Dept: FAMILY MEDICINE CLINIC | Facility: CLINIC | Age: 29
End: 2019-10-31
Payer: COMMERCIAL

## 2019-10-31 VITALS
DIASTOLIC BLOOD PRESSURE: 66 MMHG | HEART RATE: 99 BPM | HEIGHT: 61 IN | BODY MASS INDEX: 28.32 KG/M2 | WEIGHT: 150 LBS | SYSTOLIC BLOOD PRESSURE: 112 MMHG

## 2019-10-31 DIAGNOSIS — K62.5 BRIGHT RED RECTAL BLEEDING: Primary | ICD-10-CM

## 2019-10-31 PROCEDURE — 3008F BODY MASS INDEX DOCD: CPT | Performed by: FAMILY MEDICINE

## 2019-10-31 PROCEDURE — 99213 OFFICE O/P EST LOW 20 MIN: CPT | Performed by: FAMILY MEDICINE

## 2019-10-31 NOTE — PROGRESS NOTES
Assessment/Plan:    We discussed the possible causes of bright red rectal bleeding  Explain that is most likely an internal hemorrhoid or fissure  Will set her up for evaluation by colorectal surgery  Follow up here as needed  Diagnoses and all orders for this visit:    Bright red rectal bleeding  -     Ambulatory referral to Colorectal Surgery; Future  -     hydrocortisone-pramoxine (PROCTOFOAM-HC) rectal foam; Insert 1 applicator into the rectum 2 (two) times a day          Subjective:      Patient ID: Conchita Clay is a 30 y o  female  She complains of bright red rectal bleeding for more than a week  No rectal pain burning or itching  The following portions of the patient's history were reviewed and updated as appropriate: allergies, current medications, past family history, past medical history, past social history, past surgical history and problem list     Review of Systems   Constitutional: Negative  HENT: Negative  Eyes: Negative  Respiratory: Negative  Cardiovascular: Negative  Gastrointestinal: Positive for anal bleeding and blood in stool  Endocrine: Negative  Genitourinary: Negative  Musculoskeletal: Negative  Skin: Negative  Allergic/Immunologic: Negative  Neurological: Negative  Hematological: Negative  Psychiatric/Behavioral: Negative  All other systems reviewed and are negative  Objective:      /66 (BP Location: Right arm)   Pulse 99   Ht 5' 1" (1 549 m)   Wt 68 kg (150 lb)   BMI 28 34 kg/m²          Physical Exam   Constitutional: She is oriented to person, place, and time  She appears well-developed and well-nourished  HENT:   Head: Normocephalic and atraumatic  Right Ear: External ear normal    Left Ear: External ear normal    Nose: Nose normal    Mouth/Throat: Oropharynx is clear and moist    Eyes: Pupils are equal, round, and reactive to light  Conjunctivae and EOM are normal    Neck: Normal range of motion  Neck supple  Cardiovascular: Normal rate, regular rhythm and normal heart sounds  Pulmonary/Chest: Effort normal and breath sounds normal    Abdominal: Soft  Bowel sounds are normal    Musculoskeletal: Normal range of motion  Neurological: She is alert and oriented to person, place, and time  She has normal reflexes  Skin: Skin is warm and dry  Psychiatric: She has a normal mood and affect  Her behavior is normal    Nursing note and vitals reviewed

## 2019-12-08 DIAGNOSIS — J40 BRONCHITIS: ICD-10-CM

## 2019-12-08 DIAGNOSIS — J45.50 SEVERE PERSISTENT ASTHMA WITHOUT COMPLICATION: ICD-10-CM

## 2019-12-08 RX ORDER — ALBUTEROL SULFATE 90 UG/1
AEROSOL, METERED RESPIRATORY (INHALATION)
Qty: 8.5 INHALER | Refills: 0 | Status: CANCELLED | OUTPATIENT
Start: 2019-12-08

## 2019-12-09 ENCOUNTER — TELEPHONE (OUTPATIENT)
Dept: FAMILY MEDICINE CLINIC | Facility: CLINIC | Age: 29
End: 2019-12-09

## 2019-12-09 NOTE — TELEPHONE ENCOUNTER
Patient is requesting refill of Zyrtec  Patient was seen on 10/31  Have placed order for approval Thank you

## 2019-12-10 RX ORDER — CETIRIZINE HYDROCHLORIDE 10 MG/1
10 TABLET ORAL DAILY
Qty: 90 TABLET | Refills: 0 | Status: SHIPPED | OUTPATIENT
Start: 2019-12-10 | End: 2020-12-09

## 2019-12-23 DIAGNOSIS — J45.50 SEVERE PERSISTENT ASTHMA WITHOUT COMPLICATION: ICD-10-CM

## 2019-12-23 NOTE — TELEPHONE ENCOUNTER
Patient is requesting refill of albuterol inhaler  Patient was seen on 10/31  Have placed order for approval  Thank you

## 2019-12-24 RX ORDER — ALBUTEROL SULFATE 90 UG/1
2 AEROSOL, METERED RESPIRATORY (INHALATION) EVERY 4 HOURS PRN
Qty: 1 INHALER | Refills: 2 | Status: SHIPPED | OUTPATIENT
Start: 2019-12-24 | End: 2020-02-26 | Stop reason: ALTCHOICE

## 2020-02-26 ENCOUNTER — TELEPHONE (OUTPATIENT)
Dept: FAMILY MEDICINE CLINIC | Facility: CLINIC | Age: 30
End: 2020-02-26

## 2020-02-26 DIAGNOSIS — J45.50 SEVERE PERSISTENT ASTHMA WITHOUT COMPLICATION: Primary | ICD-10-CM

## 2020-02-26 RX ORDER — ALBUTEROL SULFATE 90 UG/1
2 AEROSOL, METERED RESPIRATORY (INHALATION) EVERY 6 HOURS PRN
Qty: 18 G | Refills: 2 | Status: SHIPPED | OUTPATIENT
Start: 2020-02-26 | End: 2020-05-11

## 2020-02-26 NOTE — TELEPHONE ENCOUNTER
If patient is having difficulty with wheezing patient probably needs an appointment to see Dr Jessika Miller       Also be sure the patient does not smoke any cigarettes or use any perfumes or candles or any air  Fresheners    And that no one smokes in the home    Also check and see if there is any pets in the bedroom such as cats or dogs      Needs appointment    See Dr Teresa Mancia her  appt        Albuterol  Is  Ventolin    So  I  Switched  To  Ventolin  For  Her

## 2020-02-26 NOTE — TELEPHONE ENCOUNTER
Regarding: Prescription Question  Contact: [phone]  ----- Message from Dominick Mason sent at 2/26/2020  7:26 AM EST -----  Please respond to this as Dr Masters is out and what can we give until he is back?     ----- Message from Dar Albright to Garth Rosario MD sent at 2/26/2020  5:29 AM -----   ,    I am currently taking the Albuterol Inhaler, I am finding it not to be strong enough  Is there anyway I can be prescribed the Ventolin inhaler? That one seemed to work better for me

## 2020-02-27 NOTE — TELEPHONE ENCOUNTER
Patient made aware ventolin sent to pharmacy per her request  Also informed her if not helping she needs to have apt with Dr Chino Ledezma

## 2020-04-21 DIAGNOSIS — J45.50 SEVERE PERSISTENT ASTHMA WITHOUT COMPLICATION: ICD-10-CM

## 2020-04-21 RX ORDER — MONTELUKAST SODIUM 10 MG/1
TABLET ORAL
Qty: 90 TABLET | Refills: 1 | Status: SHIPPED | OUTPATIENT
Start: 2020-04-21

## 2020-04-30 ENCOUNTER — TELEPHONE (OUTPATIENT)
Dept: OTHER | Facility: OTHER | Age: 30
End: 2020-04-30

## 2020-04-30 ENCOUNTER — NURSE TRIAGE (OUTPATIENT)
Dept: OTHER | Facility: OTHER | Age: 30
End: 2020-04-30

## 2020-05-01 ENCOUNTER — OFFICE VISIT (OUTPATIENT)
Dept: FAMILY MEDICINE CLINIC | Facility: CLINIC | Age: 30
End: 2020-05-01
Payer: COMMERCIAL

## 2020-05-01 VITALS
SYSTOLIC BLOOD PRESSURE: 104 MMHG | DIASTOLIC BLOOD PRESSURE: 68 MMHG | WEIGHT: 135 LBS | TEMPERATURE: 97.3 F | BODY MASS INDEX: 25.49 KG/M2 | HEIGHT: 61 IN

## 2020-05-01 DIAGNOSIS — N30.01 ACUTE CYSTITIS WITH HEMATURIA: Primary | ICD-10-CM

## 2020-05-01 PROBLEM — R30.0 DYSURIA: Status: ACTIVE | Noted: 2020-05-01

## 2020-05-01 LAB
SL AMB  POCT GLUCOSE, UA: NEGATIVE
SL AMB LEUKOCYTE ESTERASE,UA: ABNORMAL
SL AMB POCT BILIRUBIN,UA: NEGATIVE
SL AMB POCT BLOOD,UA: ABNORMAL
SL AMB POCT CLARITY,UA: ABNORMAL
SL AMB POCT COLOR,UA: YELLOW
SL AMB POCT KETONES,UA: 1.02
SL AMB POCT NITRITE,UA: POSITIVE
SL AMB POCT PH,UA: 6
SL AMB POCT SPECIFIC GRAVITY,UA: 1
SL AMB POCT URINE PROTEIN: ABNORMAL
SL AMB POCT UROBILINOGEN: NEGATIVE

## 2020-05-01 PROCEDURE — 3008F BODY MASS INDEX DOCD: CPT | Performed by: FAMILY MEDICINE

## 2020-05-01 PROCEDURE — 99213 OFFICE O/P EST LOW 20 MIN: CPT | Performed by: FAMILY MEDICINE

## 2020-05-01 PROCEDURE — 81002 URINALYSIS NONAUTO W/O SCOPE: CPT | Performed by: FAMILY MEDICINE

## 2020-05-01 PROCEDURE — 1036F TOBACCO NON-USER: CPT | Performed by: FAMILY MEDICINE

## 2020-05-01 RX ORDER — PHENAZOPYRIDINE HYDROCHLORIDE 200 MG/1
200 TABLET, FILM COATED ORAL
Qty: 10 TABLET | Refills: 0 | Status: SHIPPED | OUTPATIENT
Start: 2020-05-01 | End: 2020-10-01

## 2020-05-01 RX ORDER — NITROFURANTOIN 25; 75 MG/1; MG/1
100 CAPSULE ORAL 2 TIMES DAILY
Qty: 10 CAPSULE | Refills: 0 | Status: SHIPPED | OUTPATIENT
Start: 2020-05-01 | End: 2020-05-06

## 2020-05-04 ENCOUNTER — TRANSCRIBE ORDERS (OUTPATIENT)
Dept: LAB | Facility: HOSPITAL | Age: 30
End: 2020-05-04

## 2020-05-04 ENCOUNTER — DOCUMENTATION (OUTPATIENT)
Dept: FAMILY MEDICINE CLINIC | Facility: CLINIC | Age: 30
End: 2020-05-04

## 2020-05-04 DIAGNOSIS — N30.01 ACUTE CYSTITIS WITH HEMATURIA: Primary | ICD-10-CM

## 2020-05-10 DIAGNOSIS — J45.50 SEVERE PERSISTENT ASTHMA WITHOUT COMPLICATION: ICD-10-CM

## 2020-05-11 RX ORDER — ALBUTEROL SULFATE 90 UG/1
AEROSOL, METERED RESPIRATORY (INHALATION)
Qty: 18 INHALER | Refills: 2 | Status: SHIPPED | OUTPATIENT
Start: 2020-05-11

## 2020-09-26 ENCOUNTER — APPOINTMENT (EMERGENCY)
Dept: RADIOLOGY | Facility: HOSPITAL | Age: 30
End: 2020-09-26
Payer: OTHER MISCELLANEOUS

## 2020-09-26 ENCOUNTER — HOSPITAL ENCOUNTER (EMERGENCY)
Facility: HOSPITAL | Age: 30
Discharge: HOME | End: 2020-09-26
Attending: EMERGENCY MEDICINE | Admitting: EMERGENCY MEDICINE
Payer: OTHER MISCELLANEOUS

## 2020-09-26 VITALS
WEIGHT: 137.79 LBS | BODY MASS INDEX: 26.03 KG/M2 | SYSTOLIC BLOOD PRESSURE: 162 MMHG | HEART RATE: 63 BPM | OXYGEN SATURATION: 97 % | DIASTOLIC BLOOD PRESSURE: 67 MMHG | RESPIRATION RATE: 18 BRPM | TEMPERATURE: 98.3 F

## 2020-09-26 DIAGNOSIS — S93.402A LEFT ANKLE SPRAIN: Primary | ICD-10-CM

## 2020-09-26 PROCEDURE — 99284 EMERGENCY DEPT VISIT MOD MDM: CPT | Performed by: EMERGENCY MEDICINE

## 2020-09-26 PROCEDURE — 99283 EMERGENCY DEPT VISIT LOW MDM: CPT

## 2020-09-26 PROCEDURE — 73610 X-RAY EXAM OF ANKLE: CPT

## 2020-10-01 ENCOUNTER — OFFICE VISIT (OUTPATIENT)
Dept: FAMILY MEDICINE CLINIC | Facility: CLINIC | Age: 30
End: 2020-10-01
Payer: COMMERCIAL

## 2020-10-01 VITALS
HEIGHT: 61 IN | SYSTOLIC BLOOD PRESSURE: 120 MMHG | BODY MASS INDEX: 25.68 KG/M2 | DIASTOLIC BLOOD PRESSURE: 70 MMHG | WEIGHT: 136 LBS | TEMPERATURE: 98.4 F

## 2020-10-01 DIAGNOSIS — S93.492D SPRAIN OF ANTERIOR TALOFIBULAR LIGAMENT OF LEFT ANKLE, SUBSEQUENT ENCOUNTER: Primary | ICD-10-CM

## 2020-10-01 PROBLEM — S93.492A SPRAIN OF ANTERIOR TALOFIBULAR LIGAMENT OF LEFT ANKLE: Status: ACTIVE | Noted: 2020-10-01

## 2020-10-01 PROCEDURE — 99213 OFFICE O/P EST LOW 20 MIN: CPT | Performed by: FAMILY MEDICINE

## 2020-10-01 PROCEDURE — 1036F TOBACCO NON-USER: CPT | Performed by: FAMILY MEDICINE

## 2021-03-10 DIAGNOSIS — Z23 ENCOUNTER FOR IMMUNIZATION: ICD-10-CM

## 2021-03-22 ENCOUNTER — IMMUNIZATIONS (OUTPATIENT)
Dept: FAMILY MEDICINE CLINIC | Facility: HOSPITAL | Age: 31
End: 2021-03-22

## 2021-03-22 DIAGNOSIS — Z23 ENCOUNTER FOR IMMUNIZATION: Primary | ICD-10-CM

## 2021-03-22 PROCEDURE — 0011A SARS-COV-2 / COVID-19 MRNA VACCINE (MODERNA) 100 MCG: CPT

## 2021-03-22 PROCEDURE — 91301 SARS-COV-2 / COVID-19 MRNA VACCINE (MODERNA) 100 MCG: CPT

## 2021-04-19 ENCOUNTER — IMMUNIZATIONS (OUTPATIENT)
Dept: FAMILY MEDICINE CLINIC | Facility: HOSPITAL | Age: 31
End: 2021-04-19

## 2021-04-19 DIAGNOSIS — Z23 ENCOUNTER FOR IMMUNIZATION: Primary | ICD-10-CM

## 2021-04-19 PROCEDURE — 0012A SARS-COV-2 / COVID-19 MRNA VACCINE (MODERNA) 100 MCG: CPT

## 2021-04-19 PROCEDURE — 91301 SARS-COV-2 / COVID-19 MRNA VACCINE (MODERNA) 100 MCG: CPT

## 2021-06-15 ENCOUNTER — ANNUAL EXAM (OUTPATIENT)
Dept: OBGYN CLINIC | Facility: CLINIC | Age: 31
End: 2021-06-15
Payer: COMMERCIAL

## 2021-06-15 VITALS
WEIGHT: 137.6 LBS | BODY MASS INDEX: 25.98 KG/M2 | DIASTOLIC BLOOD PRESSURE: 80 MMHG | HEIGHT: 61 IN | SYSTOLIC BLOOD PRESSURE: 118 MMHG

## 2021-06-15 DIAGNOSIS — B97.7 HIGH RISK HPV INFECTION: Primary | ICD-10-CM

## 2021-06-15 DIAGNOSIS — Z01.419 WOMEN'S ANNUAL ROUTINE GYNECOLOGICAL EXAMINATION: ICD-10-CM

## 2021-06-15 PROCEDURE — S0612 ANNUAL GYNECOLOGICAL EXAMINA: HCPCS | Performed by: OBSTETRICS & GYNECOLOGY

## 2021-06-15 PROCEDURE — 87624 HPV HI-RISK TYP POOLED RSLT: CPT | Performed by: OBSTETRICS & GYNECOLOGY

## 2021-06-15 PROCEDURE — G0145 SCR C/V CYTO,THINLAYER,RESCR: HCPCS | Performed by: OBSTETRICS & GYNECOLOGY

## 2021-06-15 NOTE — PROGRESS NOTES
Assessment     Annual well-woman exam    History of high-risk HPV        Plan     All questions answered  Await pap smear results  Subjective  Here for annual exam     Ancelmo Mccray is a 33 y o  female who presents for annual exam  Periods are regular every 28-30 days, lasting 5 days  Dysmenorrhea:none  Cyclic symptoms include none  No intermenstrual bleeding, spotting, or discharge  The patient reports that there is not domestic violence in her life  Current contraception: none  History of abnormal Pap smear: yes -  High-risk HPV  Family history of uterine or ovarian cancer: no  Regular self breast exam: yes  History of abnormal mammogram: no  Family history of breast cancer: no  History of abnormal lipids: no  Menstrual History:  OB History        0    Para   0    Term   0       0    AB   0    Living   0       SAB   0    TAB   0    Ectopic   0    Multiple   0    Live Births   0                Menarche age: 13  No LMP recorded (approximate)  Review of Systems  Pertinent items are noted in HPI        Objective  No acute distress     /80 (BP Location: Left arm, Patient Position: Sitting, Cuff Size: Standard)   Ht 5' 1" (1 549 m)   Wt 62 4 kg (137 lb 9 6 oz)   LMP  (Approximate) Comment: Some time in May  BMI 26 00 kg/m²     General:   alert and oriented, in no acute distress, alert, appears stated age and cooperative   Heart: regular rate and rhythm, S1, S2 normal, no murmur, click, rub or gallop   Lungs: clear to auscultation bilaterally   Abdomen: soft, non-tender, without masses or organomegaly   Vulva: normal, Bartholin's, Urethra, D'Lo's normal, female escutcheon   Vagina: normal mucosa, normal discharge, no palpable nodules   Cervix: no bleeding following Pap, no cervical motion tenderness, no lesions and nulliparous appearance   Uterus: normal size, mobile, non-tender, normal shape and consistency   Adnexa: normal adnexa and no mass, fullness, tenderness   Bilateral breast exam in the sitting and supine position with chaperone present, no visible or palpable breast lesions identified  No breast masses noted  No supraclavicular or axillary lymphadenopathy noted  No nipple discharge  Reviewed self-breast exam techniques     Rectal exam,  deferred

## 2021-06-17 LAB
HPV HR 12 DNA CVX QL NAA+PROBE: POSITIVE
HPV16 DNA CVX QL NAA+PROBE: NEGATIVE
HPV18 DNA CVX QL NAA+PROBE: NEGATIVE

## 2021-06-21 LAB
LAB AP GYN PRIMARY INTERPRETATION: NORMAL
Lab: NORMAL

## 2021-06-22 ENCOUNTER — TELEPHONE (OUTPATIENT)
Dept: OBGYN CLINIC | Facility: CLINIC | Age: 31
End: 2021-06-22

## 2021-06-22 NOTE — TELEPHONE ENCOUNTER
----- Message from Michoacano Bartlett DO sent at 6/21/2021  2:08 PM EDT -----   Recurrent high-risk HPV detected, recommend colposcopy  Please schedule at her earliest convenience, preferably at our [facility] location

## 2021-06-24 ENCOUNTER — OFFICE VISIT (OUTPATIENT)
Dept: FAMILY MEDICINE CLINIC | Facility: CLINIC | Age: 31
End: 2021-06-24
Payer: COMMERCIAL

## 2021-06-24 VITALS
BODY MASS INDEX: 26.55 KG/M2 | WEIGHT: 140.6 LBS | DIASTOLIC BLOOD PRESSURE: 68 MMHG | HEIGHT: 61 IN | TEMPERATURE: 96.1 F | SYSTOLIC BLOOD PRESSURE: 102 MMHG

## 2021-06-24 DIAGNOSIS — Z00.00 WELL ADULT EXAM: Primary | ICD-10-CM

## 2021-06-24 DIAGNOSIS — J45.50 SEVERE PERSISTENT ASTHMA WITHOUT COMPLICATION: ICD-10-CM

## 2021-06-24 PROCEDURE — 3725F SCREEN DEPRESSION PERFORMED: CPT | Performed by: FAMILY MEDICINE

## 2021-06-24 PROCEDURE — 3008F BODY MASS INDEX DOCD: CPT | Performed by: FAMILY MEDICINE

## 2021-06-24 PROCEDURE — 1036F TOBACCO NON-USER: CPT | Performed by: FAMILY MEDICINE

## 2021-06-24 PROCEDURE — 99395 PREV VISIT EST AGE 18-39: CPT | Performed by: FAMILY MEDICINE

## 2021-06-24 NOTE — PROGRESS NOTES
Assessment/Plan:  Patient will go for laboratory studies  Patient will follow-up gynecologist appropriately for abnormal Pap  No early family history of breast cancer or colorectal cancer  Vaccines up-to-date       Diagnoses and all orders for this visit:    Well adult exam  -     CBC and differential; Future  -     Comprehensive metabolic panel; Future  -     Lipid panel; Future  -     TSH, 3rd generation with Free T4 reflex; Future    Severe persistent asthma without complication            Subjective:        Patient ID: Toyin Mills is a 33 y o  female  Patient is here for wellness exam   Patient asymptomatic at this time  No chest pain shortness breath abdominal pain or problems urinating or defecating  Menstrual cycles are normal   No headache or visual changes  Patient does get some thoracic back discomfort when work  Patient did see gynecologist recently  Patient with abnormal Pap in as following up appropriately  No breast related issues  No early family history of colon cancer  Labs and vaccines reviewed  The following portions of the patient's history were reviewed and updated as appropriate: allergies, current medications, past family history, past medical history, past social history, past surgical history and problem list       Review of Systems   Constitutional: Negative  HENT: Negative  Eyes: Negative  Respiratory: Negative  Cardiovascular: Negative  Gastrointestinal: Negative  Endocrine: Negative  Genitourinary: Negative  Musculoskeletal: Negative  Skin: Negative  Allergic/Immunologic: Negative  Neurological: Negative  Hematological: Negative  Psychiatric/Behavioral: Negative  Objective:      BMI Counseling: Body mass index is 26 57 kg/m²  The BMI is above normal  Nutrition recommendations include decreasing portion sizes  Exercise recommendations include moderate physical activity 150 minutes/week         Depression Screening and Follow-up Plan: Patient's depression screening was positive with a PHQ-2 score of 0  Clincally patient does not have depression  No treatment is required  /68 (BP Location: Right arm, Patient Position: Sitting, Cuff Size: Standard)   Temp (!) 96 1 °F (35 6 °C) (Tympanic)   Ht 5' 1" (1 549 m)   Wt 63 8 kg (140 lb 9 6 oz)   LMP 06/24/2021 (Exact Date)   BMI 26 57 kg/m²          Physical Exam  Vitals and nursing note reviewed  Constitutional:       General: She is not in acute distress  Appearance: Normal appearance  She is not ill-appearing, toxic-appearing or diaphoretic  HENT:      Head: Normocephalic and atraumatic  Right Ear: Tympanic membrane, ear canal and external ear normal  There is no impacted cerumen  Left Ear: Tympanic membrane, ear canal and external ear normal  There is no impacted cerumen  Nose: Nose normal  No congestion or rhinorrhea  Mouth/Throat:      Mouth: Mucous membranes are moist       Pharynx: No oropharyngeal exudate or posterior oropharyngeal erythema  Eyes:      General: No scleral icterus  Right eye: No discharge  Left eye: No discharge  Extraocular Movements: Extraocular movements intact  Conjunctiva/sclera: Conjunctivae normal       Pupils: Pupils are equal, round, and reactive to light  Neck:      Vascular: No carotid bruit  Cardiovascular:      Rate and Rhythm: Normal rate and regular rhythm  Pulses: Normal pulses  Heart sounds: Normal heart sounds  No murmur heard  No friction rub  No gallop  Pulmonary:      Effort: Pulmonary effort is normal  No respiratory distress  Breath sounds: Normal breath sounds  No stridor  No wheezing, rhonchi or rales  Chest:      Chest wall: No tenderness  Abdominal:      General: Abdomen is flat  Bowel sounds are normal  There is no distension  Palpations: Abdomen is soft  Tenderness: There is no abdominal tenderness   There is no guarding or rebound  Musculoskeletal:         General: No swelling, tenderness, deformity or signs of injury  Normal range of motion  Cervical back: Normal range of motion and neck supple  No rigidity  No muscular tenderness  Right lower leg: No edema  Left lower leg: No edema  Lymphadenopathy:      Cervical: No cervical adenopathy  Skin:     General: Skin is warm and dry  Capillary Refill: Capillary refill takes less than 2 seconds  Coloration: Skin is not jaundiced  Findings: No bruising, erythema, lesion or rash  Neurological:      General: No focal deficit present  Mental Status: She is alert and oriented to person, place, and time  Cranial Nerves: No cranial nerve deficit  Sensory: No sensory deficit  Motor: No weakness  Coordination: Coordination normal       Gait: Gait normal    Psychiatric:         Mood and Affect: Mood normal          Behavior: Behavior normal          Thought Content:  Thought content normal          Judgment: Judgment normal

## 2021-07-15 ENCOUNTER — APPOINTMENT (OUTPATIENT)
Dept: LAB | Facility: CLINIC | Age: 31
End: 2021-07-15
Payer: COMMERCIAL

## 2021-07-15 DIAGNOSIS — Z00.00 WELL ADULT EXAM: ICD-10-CM

## 2021-07-15 LAB
ALBUMIN SERPL BCP-MCNC: 3.2 G/DL (ref 3.5–5)
ALP SERPL-CCNC: 52 U/L (ref 46–116)
ALT SERPL W P-5'-P-CCNC: 21 U/L (ref 12–78)
ANION GAP SERPL CALCULATED.3IONS-SCNC: 5 MMOL/L (ref 4–13)
AST SERPL W P-5'-P-CCNC: 17 U/L (ref 5–45)
BASOPHILS # BLD AUTO: 0.04 THOUSANDS/ΜL (ref 0–0.1)
BASOPHILS NFR BLD AUTO: 1 % (ref 0–1)
BILIRUB SERPL-MCNC: 0.65 MG/DL (ref 0.2–1)
BUN SERPL-MCNC: 9 MG/DL (ref 5–25)
CALCIUM ALBUM COR SERPL-MCNC: 9.3 MG/DL (ref 8.3–10.1)
CALCIUM SERPL-MCNC: 8.7 MG/DL (ref 8.3–10.1)
CHLORIDE SERPL-SCNC: 107 MMOL/L (ref 100–108)
CHOLEST SERPL-MCNC: 184 MG/DL (ref 50–200)
CO2 SERPL-SCNC: 23 MMOL/L (ref 21–32)
CREAT SERPL-MCNC: 0.57 MG/DL (ref 0.6–1.3)
EOSINOPHIL # BLD AUTO: 0.29 THOUSAND/ΜL (ref 0–0.61)
EOSINOPHIL NFR BLD AUTO: 5 % (ref 0–6)
ERYTHROCYTE [DISTWIDTH] IN BLOOD BY AUTOMATED COUNT: 13.1 % (ref 11.6–15.1)
GFR SERPL CREATININE-BSD FRML MDRD: 122 ML/MIN/1.73SQ M
GLUCOSE P FAST SERPL-MCNC: 80 MG/DL (ref 65–99)
HCT VFR BLD AUTO: 41.7 % (ref 34.8–46.1)
HDLC SERPL-MCNC: 76 MG/DL
HGB BLD-MCNC: 13.7 G/DL (ref 11.5–15.4)
IMM GRANULOCYTES # BLD AUTO: 0.03 THOUSAND/UL (ref 0–0.2)
IMM GRANULOCYTES NFR BLD AUTO: 1 % (ref 0–2)
LDLC SERPL CALC-MCNC: 98 MG/DL (ref 0–100)
LYMPHOCYTES # BLD AUTO: 1.9 THOUSANDS/ΜL (ref 0.6–4.47)
LYMPHOCYTES NFR BLD AUTO: 30 % (ref 14–44)
MCH RBC QN AUTO: 31.1 PG (ref 26.8–34.3)
MCHC RBC AUTO-ENTMCNC: 32.9 G/DL (ref 31.4–37.4)
MCV RBC AUTO: 95 FL (ref 82–98)
MONOCYTES # BLD AUTO: 0.7 THOUSAND/ΜL (ref 0.17–1.22)
MONOCYTES NFR BLD AUTO: 11 % (ref 4–12)
NEUTROPHILS # BLD AUTO: 3.42 THOUSANDS/ΜL (ref 1.85–7.62)
NEUTS SEG NFR BLD AUTO: 52 % (ref 43–75)
NONHDLC SERPL-MCNC: 108 MG/DL
NRBC BLD AUTO-RTO: 0 /100 WBCS
PLATELET # BLD AUTO: 333 THOUSANDS/UL (ref 149–390)
PMV BLD AUTO: 9.4 FL (ref 8.9–12.7)
POTASSIUM SERPL-SCNC: 4.2 MMOL/L (ref 3.5–5.3)
PROT SERPL-MCNC: 7.3 G/DL (ref 6.4–8.2)
RBC # BLD AUTO: 4.4 MILLION/UL (ref 3.81–5.12)
SODIUM SERPL-SCNC: 135 MMOL/L (ref 136–145)
TRIGL SERPL-MCNC: 50 MG/DL
TSH SERPL DL<=0.05 MIU/L-ACNC: 2.07 UIU/ML (ref 0.36–3.74)
WBC # BLD AUTO: 6.38 THOUSAND/UL (ref 4.31–10.16)

## 2021-07-15 PROCEDURE — 36415 COLL VENOUS BLD VENIPUNCTURE: CPT

## 2021-07-15 PROCEDURE — 80061 LIPID PANEL: CPT

## 2021-07-15 PROCEDURE — 85025 COMPLETE CBC W/AUTO DIFF WBC: CPT

## 2021-07-15 PROCEDURE — 80053 COMPREHEN METABOLIC PANEL: CPT

## 2021-07-15 PROCEDURE — 84443 ASSAY THYROID STIM HORMONE: CPT

## 2021-08-12 ENCOUNTER — PROCEDURE VISIT (OUTPATIENT)
Dept: OBGYN CLINIC | Facility: CLINIC | Age: 31
End: 2021-08-12
Payer: COMMERCIAL

## 2021-08-12 VITALS — SYSTOLIC BLOOD PRESSURE: 118 MMHG | BODY MASS INDEX: 25.89 KG/M2 | DIASTOLIC BLOOD PRESSURE: 80 MMHG | WEIGHT: 137 LBS

## 2021-08-12 DIAGNOSIS — Z32.00 ENCOUNTER FOR PREGNANCY TEST, RESULT UNKNOWN: ICD-10-CM

## 2021-08-12 DIAGNOSIS — B97.7 HPV (HUMAN PAPILLOMA VIRUS) INFECTION: Primary | ICD-10-CM

## 2021-08-12 LAB — SL AMB POCT URINE HCG: NEGATIVE

## 2021-08-12 PROCEDURE — 88305 TISSUE EXAM BY PATHOLOGIST: CPT | Performed by: PATHOLOGY

## 2021-08-12 PROCEDURE — 81025 URINE PREGNANCY TEST: CPT | Performed by: OBSTETRICS & GYNECOLOGY

## 2021-08-12 PROCEDURE — 88344 IMHCHEM/IMCYTCHM EA MLT ANTB: CPT | Performed by: PATHOLOGY

## 2021-08-12 PROCEDURE — 57454 BX/CURETT OF CERVIX W/SCOPE: CPT | Performed by: OBSTETRICS & GYNECOLOGY

## 2021-08-12 NOTE — PROGRESS NOTES
Colposcopy    Date/Time: 8/12/2021 12:40 PM  Performed by: Zarina Jean MD  Authorized by: Zarina Jean MD     Consent:     Consent obtained:  Verbal and written    Consent given by:  Patient    Procedural risks discussed:  Bleeding, damage to other organs, failure rate, infection and possible continued pain    Patient questions answered: yes      Patient agrees, verbalizes understanding, and wants to proceed: yes      Educational handouts given: yes      Instructions and paperwork completed: yes    Pre-procedure:     Pre-procedure timeout performed: yes      Prepped with: acetic acid and Lugol    Procedure:     Procedure: Colposcopy w/ cervical biopsy and ECC      Under satisfactory analgesia the patient was prepped and draped in the dorsal lithotomy position: yes      [place] speculum was placed in the vagina: yes      Under colposcopic examination the transition zone was seen in entirety: yes      Endocervix was curetted using a Kevorkian curette: yes      Cervical biopsy performed with a cervical biopsy punch: yes      Monsel's solution was applied: yes      Biopsy(s): yes      Location:  Ecc, 4,7,12  Post-procedure:     Findings: White epithelium      Impression: Low grade cervical dysplasia      Patient tolerance of procedure: Tolerated well, no immediate complications  Comments:      Patient tolerated well without problems  Pelvic rest recommended for the next 5-7 days  Follow-up 2-3 weeks for office visit

## 2021-08-12 NOTE — PATIENT INSTRUCTIONS
HPV (Human Papillomavirus)   WHAT YOU NEED TO KNOW:   What is human papillomavirus (HPV)? HPV is the most common infection spread by sexual contact  It can also be spread from a mother to her baby during delivery  HPV may cause oral and genital warts or tumors in your nose, mouth, throat, and lungs  HPV may also cause vaginal, penile, and anal cancers  You may not show symptoms of any of these conditions for several years after being exposed to HPV  What are the symptoms of HPV? · Painless warts    · Genital or anal discharge, bleeding, itching, or pain    · Pain when you urinate    How is HPV diagnosed? Your healthcare provider may use a vinegar liquid to help diagnose HPV genital warts  Women 27to 72years old can be checked for HPV during regular cervical cancer screenings  An HPV test checks for certain types of HPV that can cause changes in cervical cells  Without treatment, the changed cells can become cancer  An HPV test can be done every 5 years if the results show no infection  The test can be done with or without a Pap smear  A Pap smear checks for cancer or for abnormal cells that can become cancer  You may be tested for HPV if you are diagnosed with a mouth or throat cancer  How is HPV treated? HPV cannot be cured  Conditions that are caused by HPV can be treated  You will need to be monitored closely for these conditions  Ask your healthcare provider for more information about monitoring, conditions caused by HPV, and available treatments  How can HPV infection be prevented? · Ask about the HPV vaccine  The vaccine can help protect against HPV infection  Females and males can receive the vaccine  It is most effective if given before sexual activity begins  This allows the body to build almost complete protection against HPV before contact with the virus  The vaccine is usually given at 6or 15years of age but may be given as early as 5 years   The vaccine can be given through age 45     · Always use a condom during intercourse  A condom will not completely protect you from HPV infection, but it will help lower your risk  Use a new condom or latex barrier each time you have sex  This includes oral, vaginal, and anal sex  Make sure the condom fits and is put on correctly  Rubber latex sheets or dental dams can be used for oral sex  If you are allergic to latex, use a nonlatex product such as polyurethane  When should I call my doctor? · You have warts in your genital or anal area  · You have genital or anal discharge, bleeding, itching, or pain  · You have pain when you urinate  · You have questions or concerns about your condition or care  CARE AGREEMENT:   You have the right to help plan your care  Learn about your health condition and how it may be treated  Discuss treatment options with your healthcare providers to decide what care you want to receive  You always have the right to refuse treatment  The above information is an  only  It is not intended as medical advice for individual conditions or treatments  Talk to your doctor, nurse or pharmacist before following any medical regimen to see if it is safe and effective for you  © Copyright [employer] 2021 Information is for End User's use only and may not be sold, redistributed or otherwise used for commercial purposes   All illustrations and images included in CareNotes® are the copyrighted property of [employer]  or [address] [place] [address] [employer]

## 2021-08-12 NOTE — PROGRESS NOTES
Assessment/Plan   Diagnoses and all orders for this visit:    Encounter for pregnancy test, result unknown  -     POCT urine HCG    HPV (human papilloma virus) infection  -     Colposcopy    1  Positive HPV-noted on Pap from 2019 and 6/15/21  Colposcopy done with ECC and biopsies at 4, 7, and 12  Patient tolerated the procedure well  Pelvic rest recommended for the next 5-7 days  Follow-up 2-3 weeks time to discuss results and plan treatment accordingly  Patient did have a number of questions about HPV and they were answered in detail  She was given [employer] pamphlet on HPV and other information on HPV from epic  2  Pre conceptual-patient with her  for the past 5 years  She has been attempting for the past 2-3 years time  She was briefly counseled that there are options to evaluate for this  She does not appear to be interested in any of that at this time  Follow-up 2-3 weeks for office visit with me  Subjective   Patient ID: Gary Gaston is a 33 y o  female      Vitals:    21 1231   BP: 118/80     HPI    The following portions of the patient's history were reviewed and updated as appropriate: allergies, current medications, past family history, past medical history, past social history, past surgical history and problem list   Past Medical History:   Diagnosis Date    Asthma     SOB (shortness of breath)     Urinary tract infection     Wheezing      Past Surgical History:   Procedure Laterality Date    WISDOM TOOTH EXTRACTION       OB History    Para Term  AB Living   0 0 0 0 0 0   SAB TAB Ectopic Multiple Live Births   0 0 0 0 0       Current Outpatient Medications:     albuterol (PROVENTIL HFA,VENTOLIN HFA) 90 mcg/act inhaler, TAKE 2 PUFFS BY MOUTH EVERY 6 HOURS AS NEEDED FOR WHEEZE, Disp: 18 Inhaler, Rfl: 2    cetirizine (ZyrTEC) 10 mg tablet, Take 1 tablet (10 mg total) by mouth daily, Disp: 90 tablet, Rfl: 0    montelukast (SINGULAIR) 10 mg tablet, TAKE 1 TABLET BY MOUTH DAILY AT BEDTIME, Disp: 90 tablet, Rfl: 1  No Known Allergies  Social History     Socioeconomic History    Marital status: /Civil Union     Spouse name: Not on file    Number of children: Not on file    Years of education: Not on file    Highest education level: Not on file   Occupational History    Not on file   Tobacco Use    Smoking status: Never Smoker    Smokeless tobacco: Never Used   Vaping Use    Vaping Use: Never used   Substance and Sexual Activity    Alcohol use: Yes     Comment: social    Drug use: Never    Sexual activity: Yes     Partners: Male     Birth control/protection: None   Other Topics Concern    Not on file   Social History Narrative    Not on file     Social Determinants of Health     Financial Resource Strain:     Difficulty of Paying Living Expenses:    Food Insecurity:     Worried About Running Out of Food in the Last Year:     [address] in the Last Year:    Transportation Needs:     Lack of Transportation (Medical):      Lack of Transportation (Non-Medical):    Physical Activity:     Days of Exercise per Week:     Minutes of Exercise per Session:    Stress:     Feeling of Stress :    Social Connections:     Frequency of Communication with Friends and Family:     Frequency of Social Gatherings with Friends and Family:     Attends [employer] Services:     Active Member of Clubs or Organizations:     Attends Club or Organization Meetings:     Marital Status:    Intimate Partner Violence:     Fear of Current or Ex-Partner:     Emotionally Abused:     Physically Abused:     Sexually Abused:      Family History   Problem Relation Age of Onset    Asthma Mother     Diabetes Mother     Ulcers Mother     Depression Mother     No Known Problems Father     Asthma Sister     Cancer Maternal Grandfather        Review of Systems    Objective   Physical Exam  OBGyn Exam     Objective      /80 (BP Location: Left arm, Patient Position: Sitting, Cuff Size: Adult)   Wt 62 1 kg (137 lb)   LMP 08/05/2021   BMI 25 89 kg/m²     General:   alert and oriented, in no acute distress   Neck:    Breast:    Heart:    Lungs:    Abdomen: soft, non-tender, without masses or organomegaly   Vulva: normal   Vagina: Without erythema or lesions or discharge  Normal   Cervix: Without lesions or discharge or cervicitis    No Cervical motion tenderness   Uterus:    Adnexa:    Rectum:     Psych:  Normal mood and affect   Skin:  Without obvious lesions   Eyes: symmetric, with normal movements and reactivity   Musculoskeletal:  Normal muscle tone and movements appreciated

## 2021-08-26 ENCOUNTER — OFFICE VISIT (OUTPATIENT)
Dept: OBGYN CLINIC | Facility: CLINIC | Age: 31
End: 2021-08-26
Payer: COMMERCIAL

## 2021-08-26 VITALS — SYSTOLIC BLOOD PRESSURE: 120 MMHG | WEIGHT: 138.2 LBS | DIASTOLIC BLOOD PRESSURE: 80 MMHG | BODY MASS INDEX: 26.11 KG/M2

## 2021-08-26 DIAGNOSIS — N87.0 MILD CERVICAL DYSPLASIA: Primary | ICD-10-CM

## 2021-08-26 DIAGNOSIS — B97.7 HPV (HUMAN PAPILLOMA VIRUS) INFECTION: ICD-10-CM

## 2021-08-26 PROCEDURE — 1036F TOBACCO NON-USER: CPT | Performed by: OBSTETRICS & GYNECOLOGY

## 2021-08-26 PROCEDURE — 99213 OFFICE O/P EST LOW 20 MIN: CPT | Performed by: OBSTETRICS & GYNECOLOGY

## 2021-08-26 NOTE — PROGRESS NOTES
Assessment/Plan   Diagnoses and all orders for this visit:    Mild cervical dysplasia    HPV (human papilloma virus) infection    1  Positive HPV/MARÍA 1-patient with HPV with negative Pap 2019 and 6/15/21  Colposcopy was done with negative ECC and negative biopsies at 2 and 10 with biopsy at 0700 hours suggestive of MARÍA 1  Patient was counseled in detail about the findings  She was counseled in detail about HPV and possible progression of cervical dysplasia over many years  Given that she had no high-grade changes, no intervention is recommended  Follow-up Pap with HPV 2022 or as needed  2  Pre conceptual-with her  for the past 5 years  She is not using contraception at this time and would welcome a pregnancy if it occurs  Recommend vitamins with folic acid  Also recommend healthy diet, avoidance of smoking, drinking, drug use  Follow-up 2022 for yearly exam with Pap/HPV or as needed  Subjective   Patient ID: Nenita Whitt is a 33 y o  female  Vitals:    21 1358   BP: 120/80     Patient was seen today for follow-up visit  Please see assessment plan for details        The following portions of the patient's history were reviewed and updated as appropriate: allergies, current medications, past family history, past medical history, past social history, past surgical history and problem list   Past Medical History:   Diagnosis Date    Asthma     SOB (shortness of breath)     Urinary tract infection     Wheezing      Past Surgical History:   Procedure Laterality Date    WISDOM TOOTH EXTRACTION       OB History    Para Term  AB Living   0 0 0 0 0 0   SAB TAB Ectopic Multiple Live Births   0 0 0 0 0       Current Outpatient Medications:     albuterol (PROVENTIL HFA,VENTOLIN HFA) 90 mcg/act inhaler, TAKE 2 PUFFS BY MOUTH EVERY 6 HOURS AS NEEDED FOR WHEEZE, Disp: 18 Inhaler, Rfl: 2    cetirizine (ZyrTEC) 10 mg tablet, Take 1 tablet (10 mg total) by mouth daily, Disp: 90 tablet, Rfl: 0    montelukast (SINGULAIR) 10 mg tablet, TAKE 1 TABLET BY MOUTH DAILY AT BEDTIME, Disp: 90 tablet, Rfl: 1  No Known Allergies  Social History     Socioeconomic History    Marital status: /Civil Union     Spouse name: Not on file    Number of children: Not on file    Years of education: Not on file    Highest education level: Not on file   Occupational History    Not on file   Tobacco Use    Smoking status: Never Smoker    Smokeless tobacco: Never Used   Vaping Use    Vaping Use: Never used   Substance and Sexual Activity    Alcohol use: Yes     Comment: social    Drug use: Never    Sexual activity: Yes     Partners: Male     Birth control/protection: None   Other Topics Concern    Not on file   Social History Narrative    Not on file     Social Determinants of Health     Financial Resource Strain:     Difficulty of Paying Living Expenses:    Food Insecurity:     Worried About Running Out of Food in the Last Year:     Ran Out of Food in the Last Year:    Transportation Needs:     Lack of Transportation (Medical):      Lack of Transportation (Non-Medical):    Physical Activity:     Days of Exercise per Week:     Minutes of Exercise per Session:    Stress:     Feeling of Stress :    Social Connections:     Frequency of Communication with Friends and Family:     Frequency of Social Gatherings with Friends and Family:     Attends Jain Services:     Active Member of Clubs or Organizations:     Attends Club or Organization Meetings:     Marital Status:    Intimate Partner Violence:     Fear of Current or Ex-Partner:     Emotionally Abused:     Physically Abused:     Sexually Abused:      Family History   Problem Relation Age of Onset    Asthma Mother     Diabetes Mother     Ulcers Mother     Depression Mother     No Known Problems Father     Asthma Sister     Cancer Maternal Grandfather        Review of Systems   Constitutional: Negative for chills, diaphoresis, fatigue and fever  Respiratory: Negative for apnea, cough, chest tightness, shortness of breath and wheezing  Cardiovascular: Negative for chest pain, palpitations and leg swelling  Gastrointestinal: Negative for abdominal distention, abdominal pain, anal bleeding, constipation, diarrhea, nausea, rectal pain and vomiting  Genitourinary: Negative for difficulty urinating, dyspareunia, dysuria, frequency, hematuria, menstrual problem, pelvic pain, urgency, vaginal bleeding, vaginal discharge and vaginal pain  Musculoskeletal: Negative for arthralgias, back pain and myalgias  Skin: Negative for color change and rash  Neurological: Negative for dizziness, syncope, light-headedness, numbness and headaches  Hematological: Negative for adenopathy  Does not bruise/bleed easily  Psychiatric/Behavioral: Negative for dysphoric mood and sleep disturbance  The patient is not nervous/anxious          Objective   Physical Exam  OBGyn Exam     Objective      /80 (BP Location: Left arm, Patient Position: Sitting, Cuff Size: Adult)   Wt 62 7 kg (138 lb 3 2 oz)   LMP 08/05/2021   BMI 26 11 kg/m²     General:   alert and oriented, in no acute distress   Neck:    Breast:    Heart:    Lungs:    Abdomen: Nontender   Vulva:    Vagina:    Cervix:    Uterus:    Adnexa:    Rectum:     Psych:  Normal mood and affect   Skin:  Without obvious lesions   Eyes: symmetric, with normal movements and reactivity   Musculoskeletal:  Normal muscle tone and movements appreciated

## 2021-08-26 NOTE — PATIENT INSTRUCTIONS
Planning for Pregnancy   WHAT YOU NEED TO KNOW:   Why is it important to plan for pregnancy? You can help get your body ready for a healthy pregnancy  A healthy pregnancy can improve your ability to have a healthy baby  The steps you need to take and the amount of time needed depends on your current health and habits  Work with your healthcare provider to help you plan a healthy pregnancy  What do I need to know about nutrition and exercise before pregnancy? · Eat a variety of healthy foods  Healthy foods include fruits, vegetables, whole-grain breads, low-fat dairy foods, beans, lean meats, and fish  Limit foods high in sugar, fat, and sodium  Limit your intake of fish to 2 servings each week  Choose fish low in mercury such as canned light tuna, shrimp, salmon, cod, or tilapia  Do not  eat fish high in mercury such as swordfish, tilefish, lloyd mackerel, and shark  · Take 400 micrograms (mcg) of folic acid each day  This will help to prevent birth defects of the brain and spine such as spina bifida  Most women should take folic acid before pregnancy and up to 12 weeks after getting pregnant  · Exercise for at least 30 minutes, 5 days a week  Some examples of exercise include walking, biking, dancing, and swimming  Include muscle strengthening activities 2 days each week  Regular exercise provides many health benefits  It helps you manage your weight, and decreases your risk for type 2 diabetes, heart disease, stroke, and high blood pressure  Exercise can also help improve your mood  Ask your healthcare provider about the best exercise plan for you  How does weight affect pregnancy? · Obesity  can make it harder for you to get pregnant  It also increases your risk of health problems during pregnancy  Some of these health problems include gestational diabetes, high blood pressure, and infections  It can also increase your baby's risk of health problems such as birth defects   Your baby may also be large and harder to deliver or be born prematurely (early)  Your risk of miscarriage is also higher if you are obese  Work with your healthcare provider to reach a healthy weight before you try to get pregnant  · Being underweight  can also make it hard for you to get pregnant  It can also increase your risk of having a premature baby and miscarriage  Your baby may be born at a low birth weight  What do I need to know about smoking, alcohol, and drugs? · Smoking  increases your risk of a miscarriage and other health problems during pregnancy  Smoking can cause your baby to be born too early or weigh less at birth  Ask your healthcare provider for information if you need help quitting  · Alcohol  passes from your body to your baby through the placenta  It can affect your baby's brain development and cause fetal alcohol syndrome (FAS)  FAS is a group of conditions that causes mental, behavior, and growth problems  Talk to your healthcare provider if you abuse alcohol and need help quitting before pregnancy  · Drugs , such as marijuana and cocaine, should not be used while you are trying to get pregnant or during pregnancy  They increase your risk of problems during pregnancy and increase the risk of having a baby with health problems  These include birth defects, premature birth, and infant death  What do I need to know about medicines and supplements? Tell your healthcare provider about all the medicines and supplements you take  Certain medicines and supplements should not be used during pregnancy  These include over-the-counter medicines, prescription medicines, vitamins, and herbal supplements  He or she may recommend that you take different medicines that are safer during pregnancy  What do I need to know about immunizations? Tell your healthcare provider about all the immunizations you have had   If you have missed any immunizations, your healthcare provider may recommend that you update your immunizations  These include hepatitis B, influenza, MMR (measles, mumps, rubella), Tdap, and varicella immunizations  You should get 1 dose of the Tdap vaccine with each pregnancy  It is best to get the vaccine at 27 to 36 weeks of pregnancy  Which tests might I need before pregnancy? Your healthcare provider may recommend that you have tests to screen for sexually transmitted infections  These include chlamydia, gonorrhea, herpes, HIV infection, syphilis, and tuberculosis  These infectious diseases should be treated before pregnancy, if needed  What do I need to know about toxic substances? Toxic substances can harm a developing baby  Examples include cleaning products, paints, solvents, pesticides, and other chemical products  They can increase the risk of having a miscarriage, premature birth, and low-birth weight baby  They also increase the risk of developmental delay and childhood cancer  Avoid exposure to toxic substances and materials at work and home  What do I need to know about genetic testing? Tell your healthcare provider about genetic disorders, developmental delays, or other disabilities  Include your family and your partner's family  Also tell your provider about any problems in past pregnancies  Your provider may recommend that you see a healthcare professional called a genetic counselor  He or she will talk to you about how genes, birth defects, and other medical conditions are passed down  He or she can also tell you about your risk for passing a genetic disease in a future pregnancy  A screening test may include blood tests to check your DNA or your partner's DNA  Genetic tests are not always accurate or complete  Your baby may be born with a genetic disorder that did not show up in the tests  Talk to your healthcare provider about any concerns you have with genetic testing  How can I prepare for pregnancy if I have a medical condition?   Medical conditions such as diabetes, high blood pressure, asthma, seizure disorders, and thyroid disorders should be managed before pregnancy  Mental health conditions, such as depression and anxiety, should also be treated  This will decrease your risk of having health problems during pregnancy  It will also decrease your baby's risk of medical problems  Medicines used to treat certain conditions are not safe to use during pregnancy and may need to be changed before you get pregnant  Ask your healthcare provider if it is safe for you to get pregnant if you have a medical condition  CARE AGREEMENT:   You have the right to help plan your care  Learn about your health condition and how it may be treated  Discuss treatment options with your healthcare providers to decide what care you want to receive  You always have the right to refuse treatment  The above information is an  only  It is not intended as medical advice for individual conditions or treatments  Talk to your doctor, nurse or pharmacist before following any medical regimen to see if it is safe and effective for you  © Copyright [address] 2021 Information is for End User's use only and may not be sold, redistributed or otherwise used for commercial purposes  All illustrations and images included in CareNotes® are the copyrighted property of A D A M , Inc  or [facility]  Cervical Intraepithelial Neoplasia   WHAT YOU NEED TO KNOW:   What is cervical intraepithelial neoplasia? Cervical intraepithelial neoplasia occurs when there are changes in the cells on the surface of your cervix  It is also called cervical dysplasia, or MARÍA  The cervix is where the lower part of the uterus meets the vagina  MARÍA may develop into cancer if it is not found and treated  What causes MARÍA?  MARÍA is most often caused by a human papillomavirus (HPV) infection  HPV is a sexually transmitted infection (STI)   The following may increase your risk for MARÍA:  · Cigarette smoking    · Exposure to certain medicines, such as diethylstilbestrol (PARISH)    · Having a child before age 12     · Having sex for the first time before age 25    · Multiple sexual partners    How is MARÍA diagnosed? MARÍA is usually found after one or more of the following tests:  · A Papanicolaou (Pap) test  is done during a pelvic exam to check for abnormal cells in the cervix  Cells are collected and sent to a lab for tests  The sample may also be checked for HPV  · A colposcopy  is a procedure where your healthcare provider uses a small scope with a light to look more closely at your cervix and vagina  · A biopsy  is when a small sample of tissue is removed from your cervix  It may be taken during a colposcopy  The sample is sent to a lab and tested for abnormal cells  How do healthcare providers classify MARÍA? Healthcare providers classify MARÍA based upon how thick and how deep abnormal cells are found on your cervix  · MARÍA I  is also called mild MARÍA  This occurs when there are only a few abnormal cells found on the surface of your cervix  · MARÍA II  is also called moderate MARÍA  This occurs when ? of the lining of your cervix has abnormal cells  · MARÍA III  is also called severe MARÍA or carcinoma-in-situ  This means that the entire lining of your cervix has abnormal cells  This often progresses to become cervical cancer  How is MARÍA treated? No treatment is usually needed for mild MARÍA  Your healthcare provider may want you to have more frequent Pap tests  You may also need to have more frequent colposcopies to see if the cells have changed  If you have moderate or severe MARÍA, you may need surgery to burn, freeze, or remove the abnormal cells  How can I manage my condition? · Have regular Pap tests  Ask your healthcare provider how often you should have a Pap test      · Do not smoke  If you smoke, it is never too late to quit  Smoking increases the risk of MARÍA   Ask your healthcare provider for information if you need help quitting  How can I prevent another HPV infection? · Ask about a vaccination  to reduce the risk of HPV infection  If you are younger than 32years old, you may be able to receive a vaccine to prevent an HPV infection  · Use a condom  when you have sex  When should I contact my healthcare provider? · You have a fever  · You have chills, a cough, or feel weak and achy  · You have heavy vaginal bleeding (soaking 1 pad every hour)  · You have yellow or foul smelling discharge from your vagina  · You have severe abdominal pain or vomiting  · You have questions or concerns about your condition or care  When should I seek immediate care or call 911? · You have sudden shortness of breath  CARE AGREEMENT:   You have the right to help plan your care  Learn about your health condition and how it may be treated  Discuss treatment options with your healthcare providers to decide what care you want to receive  You always have the right to refuse treatment  The above information is an  only  It is not intended as medical advice for individual conditions or treatments  Talk to your doctor, nurse or pharmacist before following any medical regimen to see if it is safe and effective for you  © Copyright [employer] 2021 Information is for End User's use only and may not be sold, redistributed or otherwise used for commercial purposes  All illustrations and images included in CareNotes® are the copyrighted property of A D A M , Inc  or [facility] [place] Rachelle Amador   HPV (Human Papillomavirus)   WHAT YOU NEED TO KNOW:   What is human papillomavirus (HPV)? HPV is the most common infection spread by sexual contact  It can also be spread from a mother to her baby during delivery  HPV may cause oral and genital warts or tumors in your nose, mouth, throat, and lungs  HPV may also cause vaginal, penile, and anal cancers   You may not show symptoms of any of these conditions for several years after being exposed to HPV  What are the symptoms of HPV? · Painless warts    · Genital or anal discharge, bleeding, itching, or pain    · Pain when you urinate    How is HPV diagnosed? Your healthcare provider may use a vinegar liquid to help diagnose HPV genital warts  Women 27to 72years old can be checked for HPV during regular cervical cancer screenings  An HPV test checks for certain types of HPV that can cause changes in cervical cells  Without treatment, the changed cells can become cancer  An HPV test can be done every 5 years if the results show no infection  The test can be done with or without a Pap smear  A Pap smear checks for cancer or for abnormal cells that can become cancer  You may be tested for HPV if you are diagnosed with a mouth or throat cancer  How is HPV treated? HPV cannot be cured  Conditions that are caused by HPV can be treated  You will need to be monitored closely for these conditions  Ask your healthcare provider for more information about monitoring, conditions caused by HPV, and available treatments  How can HPV infection be prevented? · Ask about the HPV vaccine  The vaccine can help protect against HPV infection  Females and males can receive the vaccine  It is most effective if given before sexual activity begins  This allows the body to build almost complete protection against HPV before contact with the virus  The vaccine is usually given at 6or 15years of age but may be given as early as 5 years  The vaccine can be given through age 39  · Always use a condom during intercourse  A condom will not completely protect you from HPV infection, but it will help lower your risk  Use a new condom or latex barrier each time you have sex  This includes oral, vaginal, and anal sex  Make sure the condom fits and is put on correctly  Rubber latex sheets or dental dams can be used for oral sex   If you are allergic to latex, use a nonlatex product such as polyurethane  When should I call my doctor? · You have warts in your genital or anal area  · You have genital or anal discharge, bleeding, itching, or pain  · You have pain when you urinate  · You have questions or concerns about your condition or care  CARE AGREEMENT:   You have the right to help plan your care  Learn about your health condition and how it may be treated  Discuss treatment options with your healthcare providers to decide what care you want to receive  You always have the right to refuse treatment  The above information is an  only  It is not intended as medical advice for individual conditions or treatments  Talk to your doctor, nurse or pharmacist before following any medical regimen to see if it is safe and effective for you  © Copyright [employer] 2021 Information is for End User's use only and may not be sold, redistributed or otherwise used for commercial purposes   All illustrations and images included in CareNotes® are the copyrighted property of [employer]  or [address] [place] [address]

## 2021-12-16 ENCOUNTER — TELEMEDICINE (OUTPATIENT)
Dept: FAMILY MEDICINE CLINIC | Facility: CLINIC | Age: 31
End: 2021-12-16
Payer: COMMERCIAL

## 2021-12-16 VITALS — HEIGHT: 61 IN | WEIGHT: 138 LBS | BODY MASS INDEX: 26.06 KG/M2

## 2021-12-16 DIAGNOSIS — J06.9 ACUTE UPPER RESPIRATORY INFECTION: Primary | ICD-10-CM

## 2021-12-16 PROCEDURE — 1036F TOBACCO NON-USER: CPT | Performed by: FAMILY MEDICINE

## 2021-12-16 PROCEDURE — 99213 OFFICE O/P EST LOW 20 MIN: CPT | Performed by: FAMILY MEDICINE

## 2021-12-16 PROCEDURE — 3008F BODY MASS INDEX DOCD: CPT | Performed by: FAMILY MEDICINE

## 2021-12-20 PROBLEM — J06.9 ACUTE UPPER RESPIRATORY INFECTION: Status: ACTIVE | Noted: 2021-12-20

## 2022-04-21 ENCOUNTER — APPOINTMENT (OUTPATIENT)
Dept: RADIOLOGY | Facility: CLINIC | Age: 32
End: 2022-04-21
Payer: COMMERCIAL

## 2022-04-21 DIAGNOSIS — M54.2 CERVICALGIA: ICD-10-CM

## 2022-04-21 PROCEDURE — 72050 X-RAY EXAM NECK SPINE 4/5VWS: CPT

## 2022-06-08 ENCOUNTER — RA CDI HCC (OUTPATIENT)
Dept: OTHER | Facility: HOSPITAL | Age: 32
End: 2022-06-08

## 2022-06-08 NOTE — PROGRESS NOTES
[facility]       Chart reviewed, no opportunity found: CHART REVIEWED, NO OPPORTUNITY FOUND        Patients Insurance        Commercial Insurance: [address] [place] [address]

## 2022-06-23 ENCOUNTER — TELEPHONE (OUTPATIENT)
Dept: ADMINISTRATIVE | Facility: OTHER | Age: 32
End: 2022-06-23

## 2022-06-23 NOTE — TELEPHONE ENCOUNTER
----- Message from Purvi Truong MA sent at 6/23/2022 11:01 AM EDT -----  Regarding: HIV Screening  HI please updated patient HM as she has her HIV 1/2 AG/AB PRF 04/25/17 with [facility]    Thanks

## 2022-06-23 NOTE — TELEPHONE ENCOUNTER
Upon review of the In Basket request we were able to locate, review, and update the patient chart as requested for Hepatitis C  and HIV  Any additional questions or concerns should be emailed to the Practice Liaisons via [email or web address] email, please do not reply via In Basket      Thank you  Joao Sepulveda

## 2022-06-30 ENCOUNTER — RA CDI HCC (OUTPATIENT)
Dept: OTHER | Facility: HOSPITAL | Age: 32
End: 2022-06-30

## 2022-06-30 NOTE — PROGRESS NOTES
[facility]       Chart reviewed, no opportunity found: CHART REVIEWED, NO OPPORTUNITY FOUND        Patients Insurance        Commercial Insurance: [address] [place] [address]

## 2022-09-08 ENCOUNTER — ANNUAL EXAM (OUTPATIENT)
Dept: GYNECOLOGY | Facility: CLINIC | Age: 32
End: 2022-09-08
Payer: COMMERCIAL

## 2022-09-08 ENCOUNTER — OFFICE VISIT (OUTPATIENT)
Dept: FAMILY MEDICINE CLINIC | Facility: CLINIC | Age: 32
End: 2022-09-08
Payer: COMMERCIAL

## 2022-09-08 VITALS
HEART RATE: 77 BPM | HEIGHT: 61 IN | BODY MASS INDEX: 24.17 KG/M2 | TEMPERATURE: 98.7 F | WEIGHT: 128 LBS | RESPIRATION RATE: 14 BRPM | OXYGEN SATURATION: 99 % | SYSTOLIC BLOOD PRESSURE: 112 MMHG | DIASTOLIC BLOOD PRESSURE: 84 MMHG

## 2022-09-08 VITALS
HEIGHT: 61 IN | WEIGHT: 129.2 LBS | BODY MASS INDEX: 24.39 KG/M2 | SYSTOLIC BLOOD PRESSURE: 106 MMHG | DIASTOLIC BLOOD PRESSURE: 72 MMHG

## 2022-09-08 DIAGNOSIS — J45.50 SEVERE PERSISTENT ASTHMA WITHOUT COMPLICATION: ICD-10-CM

## 2022-09-08 DIAGNOSIS — Z31.69 ENCOUNTER FOR PRECONCEPTION CONSULTATION: ICD-10-CM

## 2022-09-08 DIAGNOSIS — Z01.419 WOMEN'S ANNUAL ROUTINE GYNECOLOGICAL EXAMINATION: ICD-10-CM

## 2022-09-08 DIAGNOSIS — B37.31 VAGINAL CANDIDIASIS: ICD-10-CM

## 2022-09-08 DIAGNOSIS — K64.8 OTHER HEMORRHOIDS: ICD-10-CM

## 2022-09-08 DIAGNOSIS — N87.0 MILD CERVICAL DYSPLASIA: ICD-10-CM

## 2022-09-08 DIAGNOSIS — B97.7 HPV (HUMAN PAPILLOMA VIRUS) INFECTION: ICD-10-CM

## 2022-09-08 DIAGNOSIS — Z11.51 SCREENING FOR HUMAN PAPILLOMAVIRUS (HPV): Primary | ICD-10-CM

## 2022-09-08 DIAGNOSIS — M54.16 LUMBAR RADICULITIS: Primary | ICD-10-CM

## 2022-09-08 LAB
BV WHIFF TEST VAG QL: NEGATIVE
CLUE CELLS SPEC QL WET PREP: NEGATIVE
PH SMN: 4 [PH]
SL AMB POCT WET MOUNT: YES
T VAGINALIS VAG QL WET PREP: NEGATIVE
YEAST VAG QL WET PREP: POSITIVE

## 2022-09-08 PROCEDURE — G0476 HPV COMBO ASSAY CA SCREEN: HCPCS | Performed by: OBSTETRICS & GYNECOLOGY

## 2022-09-08 PROCEDURE — 0503F POSTPARTUM CARE VISIT: CPT | Performed by: FAMILY MEDICINE

## 2022-09-08 PROCEDURE — 3725F SCREEN DEPRESSION PERFORMED: CPT | Performed by: FAMILY MEDICINE

## 2022-09-08 PROCEDURE — S0612 ANNUAL GYNECOLOGICAL EXAMINA: HCPCS | Performed by: OBSTETRICS & GYNECOLOGY

## 2022-09-08 PROCEDURE — G0145 SCR C/V CYTO,THINLAYER,RESCR: HCPCS | Performed by: OBSTETRICS & GYNECOLOGY

## 2022-09-08 PROCEDURE — 99213 OFFICE O/P EST LOW 20 MIN: CPT | Performed by: FAMILY MEDICINE

## 2022-09-08 PROCEDURE — 87210 SMEAR WET MOUNT SALINE/INK: CPT | Performed by: OBSTETRICS & GYNECOLOGY

## 2022-09-08 RX ORDER — FLUCONAZOLE 150 MG/1
150 TABLET ORAL ONCE
Qty: 1 TABLET | Refills: 0 | Status: SHIPPED | OUTPATIENT
Start: 2022-09-08 | End: 2022-09-08

## 2022-09-08 RX ORDER — PREDNISONE 20 MG/1
40 TABLET ORAL DAILY
Qty: 10 TABLET | Refills: 0 | Status: SHIPPED | OUTPATIENT
Start: 2022-09-08 | End: 2022-09-13

## 2022-09-08 RX ORDER — METHOCARBAMOL 500 MG/1
500 TABLET, FILM COATED ORAL
Qty: 30 TABLET | Refills: 1 | Status: SHIPPED | OUTPATIENT
Start: 2022-09-08

## 2022-09-08 RX ORDER — FLUCONAZOLE 150 MG/1
150 TABLET ORAL ONCE
Qty: 1 TABLET | Refills: 0 | Status: SHIPPED | OUTPATIENT
Start: 2022-09-08 | End: 2022-09-08 | Stop reason: SDUPTHER

## 2022-09-08 NOTE — TELEPHONE ENCOUNTER
Patient called stating that the Diflucan you prescribed this AM was denied by the pharmacy  Can you please resubmit the rx for the diflucan so it can be filled?

## 2022-09-08 NOTE — PROGRESS NOTES
Assessment/Plan   Diagnoses and all orders for this visit:    Women's annual routine gynecological examination    HPV (human papilloma virus) infection    Mild cervical dysplasia    Vaginal candidiasis  -     POCT wet mount  -     fluconazole (DIFLUCAN) 150 mg tablet; Take 1 tablet (150 mg total) by mouth once for 1 dose    Encounter for preconception consultation    Other hemorrhoids    1  Yearly exam-Pap smear done with HPV testing, self-breast awareness reviewed  2  Mild dysplasia/HPV-patient had positive HPV with negative Pap in October 2019 and on 6/15/21  Colposcopy done 8/12/21 with changes suggestive of MARÍA 1 at 0700 hours, other biopsies negative  Repeat Pap with HPV done today with disposition as per findings  3  Yeast-patient does have discharge at baseline  She denies any symptoms  Wet prep with small amount of yeast   Patient counseled about treatment options  Electronic prescription for fluconazole 150 mg p o  X1 sent a local pharmacy  4  External hemorrhoids-noted on exam today  Practical recommendations reviewed, being followed by the patient  If symptomatic, could see GI doctor or colorectal specialist    5  Pre conceptual-not using contraception, attempting pregnancy for almost 3 years time  Did discuss fertility evaluation, patient to consider  Takes vitamin with folic acid occasionally, recommend routine use  Counseled about decreased risk of spina bifida with folic acid supplementation  Recommend up-to-date with vaccinations, healthy diet and exercise, avoidance of smoking, drinking, drug use  Patient denies any birth defects in her or her partner's family  Call if interested in evaluation for fertility  Otherwise, follow-up 1 year for yearly exam as needed     Subjective   Patient ID: Corine Barnhart is a 27 y o  female  Vitals:    09/08/22 0711   BP: 106/72     Patient was seen today for yearly exam   Please see assessment plan for details        The following portions of the patient's history were reviewed and updated as appropriate: allergies, current medications, past family history, past medical history, past social history, past surgical history and problem list   Past Medical History:   Diagnosis Date    Asthma     HPV (human papilloma virus) infection 2021    SOB (shortness of breath)     Urinary tract infection     Wheezing      Past Surgical History:   Procedure Laterality Date    WISDOM TOOTH EXTRACTION       OB History    Para Term  AB Living   0 0 0 0 0 0   SAB IAB Ectopic Multiple Live Births   0 0 0 0 0       Current Outpatient Medications:     fluconazole (DIFLUCAN) 150 mg tablet, Take 1 tablet (150 mg total) by mouth once for 1 dose, Disp: 1 tablet, Rfl: 0    albuterol (PROVENTIL HFA,VENTOLIN HFA) 90 mcg/act inhaler, TAKE 2 PUFFS BY MOUTH EVERY 6 HOURS AS NEEDED FOR WHEEZE, Disp: 18 Inhaler, Rfl: 2    cetirizine (ZyrTEC) 10 mg tablet, Take 1 tablet (10 mg total) by mouth daily, Disp: 90 tablet, Rfl: 0    montelukast (SINGULAIR) 10 mg tablet, TAKE 1 TABLET BY MOUTH DAILY AT BEDTIME, Disp: 90 tablet, Rfl: 1  No Known Allergies  Social History     Socioeconomic History    Marital status: /Civil Union     Spouse name: None    Number of children: None    Years of education: None    Highest education level: None   Occupational History    None   Tobacco Use    Smoking status: Never Smoker    Smokeless tobacco: Never Used   Vaping Use    Vaping Use: Never used   Substance and Sexual Activity    Alcohol use: Yes     Comment: social    Drug use: Never    Sexual activity: Yes     Partners: Male     Birth control/protection: None   Other Topics Concern    None   Social History Narrative    None     Social Determinants of Health     Financial Resource Strain: Not on file   Food Insecurity: Not on file   Transportation Needs: Not on file   Physical Activity: Not on file   Stress: Not on file   Social Connections: Not on file   Intimate Partner Violence: Not on file   Housing Stability: Not on file     Family History   Problem Relation Age of Onset    Asthma Mother     Diabetes Mother     Ulcers Mother     Depression Mother     No Known Problems Father     Asthma Sister     Cancer Maternal Grandfather        Review of Systems   Constitutional: Negative for chills, diaphoresis, fatigue and fever  Respiratory: Negative for apnea, cough, chest tightness, shortness of breath and wheezing  Cardiovascular: Negative for chest pain, palpitations and leg swelling  Gastrointestinal: Negative for abdominal distention, abdominal pain, anal bleeding, constipation, diarrhea, nausea, rectal pain and vomiting  Genitourinary: Negative for difficulty urinating, dyspareunia, dysuria, frequency, hematuria, menstrual problem, pelvic pain, urgency, vaginal bleeding, vaginal discharge and vaginal pain  Musculoskeletal: Negative for arthralgias, back pain and myalgias  Skin: Negative for color change and rash  Neurological: Negative for dizziness, syncope, light-headedness, numbness and headaches  Hematological: Negative for adenopathy  Does not bruise/bleed easily  Psychiatric/Behavioral: Negative for dysphoric mood and sleep disturbance  The patient is not nervous/anxious  Objective   Physical Exam  OBGyn Exam     Objective      /72 (BP Location: Right arm, Patient Position: Sitting)   Ht 5' 1" (1 549 m)   Wt 58 6 kg (129 lb 3 2 oz)   LMP 08/21/2022   BMI 24 41 kg/m²     General:   alert and oriented, in no acute distress   Neck: normal to inspection and palpation   Breast: normal appearance, no masses or tenderness   Heart:    Lungs:    Abdomen: soft, non-tender, without masses or organomegaly   Vulva: normal   Vagina: Small amount of white discharge, without erythema or lesions  Cervix: Small amount of white discharge, without lesions or cervicitis    No Cervical motion tenderness   Uterus: normal size, anteverted, non-tender   Adnexa: no mass, fullness, tenderness   Rectum: negative, external hemorrhoids noted    Psych:  Normal mood and affect   Skin:  Without obvious lesions   Eyes: symmetric, with normal movements and reactivity   Musculoskeletal:  Normal muscle tone and movements appreciated

## 2022-09-08 NOTE — TELEPHONE ENCOUNTER
Patient called  Had appointment this morning  The Rx given to her by Dr Heydi Ly for Diflucan was denied by her pharmacy due to a mix up  The pharmacist asked her to call and ask Dr Heydi Ly to re-escribe

## 2022-09-09 LAB
HPV HR 12 DNA CVX QL NAA+PROBE: NEGATIVE
HPV16 DNA CVX QL NAA+PROBE: NEGATIVE
HPV18 DNA CVX QL NAA+PROBE: NEGATIVE

## 2022-09-12 NOTE — PROGRESS NOTES
Assessment/Plan:    78-year-old woman with:  Lumbar radiculitis and asthma will give steroid burst discussed heat cold stretching anti-inflammatories will refer to physical therapy and muscle relaxer for breakthrough symptoms to support this discussed supportive care return parameters otherwise    No problem-specific Assessment & Plan notes found for this encounter  Diagnoses and all orders for this visit:    Lumbar radiculitis  -     predniSONE 20 mg tablet; Take 2 tablets (40 mg total) by mouth daily for 5 days  -     methocarbamol (ROBAXIN) 500 mg tablet; Take 1 tablet (500 mg total) by mouth daily at bedtime as needed for muscle spasms  -     Ambulatory Referral to Physical Therapy; Future    Severe persistent asthma without complication          Subjective:     Chief Complaint   Patient presents with    Well Check     Annual physical and BMI follow up    Hip Pain     Patient is complaining of right hip[ pain for a month  Patient ID: Herminio Guerrero is a 27 y o  female  Patient is a 78-year-old woman who presents for follow-up on lumbar radiculitis along with asthma she has pain radiating to her leg no fevers chills nausea vomiting tolerating p o  intake no weakness numbness or tingling persistently no other complaints at this time      The following portions of the patient's history were reviewed and updated as appropriate: allergies, current medications, past family history, past medical history, past social history, past surgical history and problem list     Review of Systems   Constitutional: Negative  HENT: Negative  Eyes: Negative  Respiratory: Negative  Cardiovascular: Negative  Gastrointestinal: Negative  Endocrine: Negative  Genitourinary: Negative  Musculoskeletal: Positive for back pain  Allergic/Immunologic: Negative  Neurological: Negative  Hematological: Negative  Psychiatric/Behavioral: Negative      All other systems reviewed and are negative  Objective:      /84 (BP Location: Right arm, Patient Position: Sitting, Cuff Size: Adult)   Pulse 77   Temp 98 7 °F (37 1 °C) (Tympanic)   Resp 14   Ht 5' 1" (1 549 m)   Wt 58 1 kg (128 lb)   LMP 08/21/2022   SpO2 99%   BMI 24 19 kg/m²          Physical Exam  Constitutional:       Appearance: She is well-developed  HENT:      Head: Atraumatic  Right Ear: External ear normal       Left Ear: External ear normal    Eyes:      Conjunctiva/sclera: Conjunctivae normal       Pupils: Pupils are equal, round, and reactive to light  Cardiovascular:      Rate and Rhythm: Normal rate and regular rhythm  Heart sounds: Normal heart sounds  Pulmonary:      Effort: Pulmonary effort is normal  No respiratory distress  Breath sounds: Normal breath sounds  Abdominal:      General: There is no distension  Palpations: Abdomen is soft  Tenderness: There is no abdominal tenderness  There is no guarding or rebound  Musculoskeletal:         General: Normal range of motion  Cervical back: Normal range of motion  Skin:     General: Skin is warm and dry  Neurological:      Mental Status: She is alert and oriented to person, place, and time  Cranial Nerves: No cranial nerve deficit  Psychiatric:         Behavior: Behavior normal          Thought Content:  Thought content normal          Judgment: Judgment normal

## 2022-09-14 LAB
LAB AP GYN PRIMARY INTERPRETATION: NORMAL
LAB AP LMP: NORMAL
Lab: NORMAL

## 2022-09-30 ENCOUNTER — APPOINTMENT (OUTPATIENT)
Dept: RADIOLOGY | Facility: CLINIC | Age: 32
End: 2022-09-30
Payer: COMMERCIAL

## 2022-09-30 ENCOUNTER — OFFICE VISIT (OUTPATIENT)
Dept: URGENT CARE | Facility: CLINIC | Age: 32
End: 2022-09-30
Payer: COMMERCIAL

## 2022-09-30 VITALS
RESPIRATION RATE: 16 BRPM | OXYGEN SATURATION: 100 % | HEART RATE: 69 BPM | SYSTOLIC BLOOD PRESSURE: 98 MMHG | DIASTOLIC BLOOD PRESSURE: 60 MMHG | TEMPERATURE: 97 F

## 2022-09-30 DIAGNOSIS — M54.31 SCIATICA OF RIGHT SIDE: ICD-10-CM

## 2022-09-30 DIAGNOSIS — S39.012A STRAIN OF LUMBAR REGION, INITIAL ENCOUNTER: Primary | ICD-10-CM

## 2022-09-30 DIAGNOSIS — S39.012A STRAIN OF LUMBAR REGION, INITIAL ENCOUNTER: ICD-10-CM

## 2022-09-30 PROCEDURE — 99213 OFFICE O/P EST LOW 20 MIN: CPT | Performed by: PHYSICIAN ASSISTANT

## 2022-09-30 PROCEDURE — 72100 X-RAY EXAM L-S SPINE 2/3 VWS: CPT

## 2022-09-30 RX ORDER — LIDOCAINE 50 MG/G
1 PATCH TOPICAL DAILY
Qty: 10 PATCH | Refills: 0 | Status: SHIPPED | OUTPATIENT
Start: 2022-09-30

## 2022-09-30 RX ORDER — IBUPROFEN 600 MG/1
600 TABLET ORAL EVERY 6 HOURS PRN
Qty: 30 TABLET | Refills: 0 | Status: SHIPPED | OUTPATIENT
Start: 2022-09-30

## 2022-09-30 RX ORDER — PREDNISONE 1 MG/1
TABLET ORAL DAILY
COMMUNITY
End: 2022-10-03

## 2022-09-30 NOTE — PROGRESS NOTES
[facility]        NAME: Raffi Alvarado is a 27 y o  female  : 1990    MRN: [id number]  DATE: 2022  TIME: 6:27 PM    BP 98/60   Pulse 69   Temp (!) 97 °F (36 1 °C)   Resp 16   SpO2 100%     Assessment and Plan   Strain of lumbar region, initial encounter [S39 012A]  1  Strain of lumbar region, initial encounter  XR spine lumbar 2 or 3 views injury    ibuprofen (MOTRIN) 600 mg tablet    lidocaine (Lidoderm) 5 %   2  Sciatica of right side  ibuprofen (MOTRIN) 600 mg tablet    lidocaine (Lidoderm) 5 %         Patient Instructions       Follow up with PCP in 3-5 days  Proceed to  ER if symptoms worsen  Chief Complaint     Chief Complaint   Patient presents with    Back Pain     Pt C/O right hip and lower back pain  Pt denies any impact injury  Pain has been since July and manageable but now the pain has increased as of yesterday  History of Present Illness       Pt with right lower back pain x 2-3 months  Getting worse over past few days, no known injury       Review of Systems   Review of Systems   Constitutional: Negative  HENT: Negative  Eyes: Negative  Respiratory: Negative  Cardiovascular: Negative  Gastrointestinal: Negative  Endocrine: Negative  Genitourinary: Negative  Musculoskeletal: Negative  Skin: Negative  Allergic/Immunologic: Negative  Neurological: Negative  Hematological: Negative  Psychiatric/Behavioral: Negative  All other systems reviewed and are negative          Current Medications       Current Outpatient Medications:     ibuprofen (MOTRIN) 600 mg tablet, Take 1 tablet (600 mg total) by mouth every 6 (six) hours as needed for mild pain, Disp: 30 tablet, Rfl: 0    lidocaine (Lidoderm) 5 %, Apply 1 patch topically daily Remove & Discard patch within 12 hours or as directed by MD, Disp: 10 patch, Rfl: 0    albuterol (PROVENTIL HFA,VENTOLIN HFA) 90 mcg/act inhaler, TAKE 2 PUFFS BY MOUTH EVERY 6 HOURS AS NEEDED FOR WHEEZE (Patient not taking: Reported on 9/30/2022), Disp: 18 Inhaler, Rfl: 2    methocarbamol (ROBAXIN) 500 mg tablet, Take 1 tablet (500 mg total) by mouth daily at bedtime as needed for muscle spasms (Patient not taking: Reported on 9/30/2022), Disp: 30 tablet, Rfl: 1    predniSONE 5 mg tablet, Take by mouth daily (Patient not taking: Reported on 9/30/2022), Disp: , Rfl:     Current Allergies     Allergies as of 09/30/2022    (No Known Allergies)            The following portions of the patient's history were reviewed and updated as appropriate: allergies, current medications, past family history, past medical history, past social history, past surgical history and problem list      Past Medical History:   Diagnosis Date    Asthma     HPV (human papilloma virus) infection 8/26/2021    SOB (shortness of breath)     Urinary tract infection     Wheezing        Past Surgical History:   Procedure Laterality Date    WISDOM TOOTH EXTRACTION         Family History   Problem Relation Age of Onset    Asthma Mother     Diabetes Mother     Ulcers Mother     Depression Mother     No Known Problems Father     Asthma Sister     Cancer Maternal Grandfather          Medications have been verified  Objective   BP 98/60   Pulse 69   Temp (!) 97 °F (36 1 °C)   Resp 16   SpO2 100%        Physical Exam     Physical Exam  Vitals and nursing note reviewed  Constitutional:       Appearance: Normal appearance  She is normal weight  Comments: Pt declines im toradol     HENT:      Head: Normocephalic and atraumatic  Right Ear: Tympanic membrane, ear canal and external ear normal       Left Ear: Tympanic membrane, ear canal and external ear normal       Nose: Nose normal       Mouth/Throat:      Mouth: Mucous membranes are moist       Pharynx: Oropharynx is clear  Eyes:      Extraocular Movements: Extraocular movements intact        Conjunctiva/sclera: Conjunctivae normal       Pupils: Pupils are equal, round, and reactive to light  Cardiovascular:      Rate and Rhythm: Normal rate and regular rhythm  Pulses: Normal pulses  Heart sounds: Normal heart sounds  Pulmonary:      Effort: Pulmonary effort is normal       Breath sounds: Normal breath sounds  Abdominal:      General: Abdomen is flat  Bowel sounds are normal    Musculoskeletal:         General: Normal range of motion  Cervical back: Normal range of motion and neck supple  Comments: Lumbar and right paraspinal tenderness right sacroiliac tender right sciatic notch pain dtr wnl great toe ext strong bilat right l3l4 paresthesias dp pulses strong    Skin:     General: Skin is warm  Capillary Refill: Capillary refill takes less than 2 seconds  Neurological:      General: No focal deficit present  Mental Status: She is alert and oriented to person, place, and time     Psychiatric:         Mood and Affect: Mood normal          Behavior: Behavior normal

## 2022-09-30 NOTE — LETTER
September 30, 2022     Patient: Raffi Alvarado   YOB: 1990   Date of Visit: 9/30/2022       To Whom It May Concern: It is my medical opinion that Raffi Alvarado may return to work on 10/3/2022  If you have any questions or concerns, please don't hesitate to call           Sincerely,        Marco A Bautista PA-C    CC: No Recipients

## 2022-10-03 ENCOUNTER — TELEPHONE (OUTPATIENT)
Dept: FAMILY MEDICINE CLINIC | Facility: CLINIC | Age: 32
End: 2022-10-03

## 2022-10-03 ENCOUNTER — OFFICE VISIT (OUTPATIENT)
Dept: FAMILY MEDICINE CLINIC | Facility: CLINIC | Age: 32
End: 2022-10-03
Payer: COMMERCIAL

## 2022-10-03 VITALS
HEIGHT: 61 IN | WEIGHT: 131 LBS | BODY MASS INDEX: 24.73 KG/M2 | OXYGEN SATURATION: 97 % | DIASTOLIC BLOOD PRESSURE: 86 MMHG | TEMPERATURE: 97.9 F | HEART RATE: 76 BPM | SYSTOLIC BLOOD PRESSURE: 100 MMHG

## 2022-10-03 DIAGNOSIS — M54.16 LUMBAR RADICULITIS: Primary | ICD-10-CM

## 2022-10-03 PROCEDURE — 99213 OFFICE O/P EST LOW 20 MIN: CPT | Performed by: FAMILY MEDICINE

## 2022-10-03 NOTE — PROGRESS NOTES
Assessment/Plan:  Patient start physical therapy as directed  X-ray lumbar spine normal   Patient use ice 10 minutes at a time  Patient use ibuprofen as needed  Note for patient to return to work next week  Diagnoses and all orders for this visit:    Lumbar radiculitis            Subjective:        Patient ID: Dejuan Krishnamurthy is a 27 y o  female  Patient is here to follow-up on lumbar radiculitis  Patient with ongoing pain lumbar region on the right with some radicular symptoms in the right buttocks and right leg laterally  No problems urinating or defecating  No left leg involvement  No fevers or chills  Patient is trying to stretch intake medications routinely  Patient has pain with sitting for too long time frame as well as with bending and lifting  Back Pain  This is a recurrent problem  The current episode started more than 1 month ago  The problem occurs constantly  The problem has been rapidly worsening since onset  The pain is present in the gluteal and sacro-iliac  The quality of the pain is described as aching, burning and shooting  The pain radiates to the right thigh  The pain is at a severity of 10/10  The pain is the same all the time  The symptoms are aggravated by bending, position, sitting and twisting  Stiffness is present all day  Associated symptoms include leg pain and paresthesias  Pertinent negatives include no abdominal pain, bladder incontinence, bowel incontinence, chest pain, dysuria, fever, headaches, numbness, paresis, pelvic pain, perianal numbness, tingling or weakness  The following portions of the patient's history were reviewed and updated as appropriate: allergies, current medications, past family history, past medical history, past social history, past surgical history and problem list       Review of Systems   Constitutional: Negative for fever  Cardiovascular: Negative for chest pain     Gastrointestinal: Negative for abdominal pain and bowel incontinence  Genitourinary: Negative for bladder incontinence, dysuria and pelvic pain  Musculoskeletal: Positive for back pain  Neurological: Positive for paresthesias  Negative for tingling, weakness, numbness and headaches  Objective:               /86 (BP Location: Right arm, Patient Position: Sitting, Cuff Size: Standard)   Pulse 76   Temp 97 9 °F (36 6 °C) (Temporal)   Ht 5' 1" (1 549 m)   Wt 59 4 kg (131 lb)   SpO2 97%   BMI 24 75 kg/m²          Physical Exam  Nursing note reviewed  Musculoskeletal:         General: Tenderness present  Comments: Tenderness right SI joint as well as the L4-S1 region on the right  Negative straight leg raise bilaterally  Neurological:      Mental Status: She is alert  Sensory: No sensory deficit  Comments: Gross sensation to light touch intact bilateral lower extremities

## 2022-10-10 ENCOUNTER — EVALUATION (OUTPATIENT)
Dept: PHYSICAL THERAPY | Facility: CLINIC | Age: 32
End: 2022-10-10
Payer: COMMERCIAL

## 2022-10-10 DIAGNOSIS — M54.16 LUMBAR RADICULITIS: ICD-10-CM

## 2022-10-10 PROCEDURE — 97161 PT EVAL LOW COMPLEX 20 MIN: CPT

## 2022-10-10 PROCEDURE — 97140 MANUAL THERAPY 1/> REGIONS: CPT

## 2022-10-10 NOTE — PROGRESS NOTES
PT Evaluation     Today's date: 10/10/2022  Patient name: Dejuan Krishnamurthy  : 1990  MRN: [id number]  Referring provider: Vicki Baron MD  Dx:   Encounter Diagnosis     ICD-10-CM    1  Lumbar radiculitis  M54 16 Ambulatory Referral to Physical Therapy                  Assessment  Assessment details: Patient is a 27year old female that presents to outpatient physical therapy with lumbar radiculitis  Pt demonstrates signs and symptoms indicate possible sciatic involvement and decreased movement coordination  Pt exhibits limited lumbopelvic mobility and decreased functional core strength  Pt also demonstrates a positive slump test  Pt demonstrates increased pain, decreased ROM, decreased strength, decreased activity tolerance, and decreased functional activity due to pain  Following manual techniques to address innominate hypomobility, Pt reported feeling better without pain with AROM  Pt would benefit from skilled physical therapy to address noted impairments, meet patient's goals, and to return to PLOF  Thank you for this referral     Impairments: abnormal gait, abnormal or restricted ROM, activity intolerance, impaired physical strength, lacks appropriate home exercise program, pain with function, poor posture  and poor body mechanics    Symptom irritability: lowUnderstanding of Dx/Px/POC: good   Prognosis: good    Goals  STGs:   1  Pt will be able to demonstrate an increase of strength by at least 1/2 grade within 4 weeks   2  Pt will be able to achieve increased ROM by at least 25% within 4 weeks  3  Pt will be able to report pain/discomfort less than 5/10 with all work duties within 4 weeks  4  Pt will be able to sit for at least 20 minutes without an increase of symptoms within 4 weeks  5  Pt will be able to report pain less than 7/10 in the morning after waking within 4 weeks    LTGs:   1  Pt will be independent with all IADLs without pain or discomfort upon discharge     2  Pt will be independent with HEP upon discharge    3  Pt will be able to report no pain or discomfort with all work duties and recreational activities for a full day upon discharge  4  Pt will be able to demonstrate at least 4+/5 MMT to R sided hip abduction upon discharge   5  Pt will be able to report no symptoms with sitting for at least 1 hour upon discharge       Plan  Plan details: Visits: 2-3x a week   Duration: 8 weeks   Patient would benefit from: PT eval and skilled physical therapy  Planned modality interventions: cryotherapy, electrical stimulation/Russian stimulation, iontophoresis, low level laser therapy, TENS, thermotherapy: hydrocollator packs, ultrasound, traction and unattended electrical stimulation  Planned therapy interventions: abdominal trunk stabilization, joint mobilization, manual therapy, massage, muscle pump exercises, neuromuscular re-education, patient education, postural training, strengthening, stretching, therapeutic activities, therapeutic training, therapeutic exercise, home exercise program, functional ROM exercises, gait training, balance/weight bearing training, balance, ADL training, activity modification, flexibility, Gutierrez taping, breathing training and IADL retraining  Treatment plan discussed with: patient        Subjective Evaluation    History of Present Illness  Mechanism of injury: Pt states that she has been have pain/discomfort for the past few months since July  Reports that recently her symptoms have been getting worse since last Thursday  Denies NANCY  States that she gets the most pain in the morning before stretching  States that she cannot sit for more than 10 minutes without having to stand up  Indicates that she works for the [employer] and required to bend, lift, twist, and sit all day  States that she did not work over the last week due to pain, and is feeling a little bit better now       Denies numbness/tingling; Denies falls: denies changes in bowel/bladder AGGS: bending, lifting, twisting, sitting,   EASES: medication, lidocaine pain, heat, stretches  Goals: be able to return to work without pain,       Imaging findings: Xray: normal finding  Pain  Current pain ratin  At best pain ratin  At worst pain rating: 10  Quality: dull ache and burning          Objective     Palpation     Additional Palpation Details  Pain and tenderness is palpation to R PSIS and R piriformis    Neurological Testing     Sensation     Lumbar   Left   Intact: light touch    Right   Intact: light touch    Reflexes   Left   Patellar (L4): normal (2+)  Achilles (S1): normal (2+)    Right   Patellar (L4): trace (1+)  Achilles (S1): normal (2+)    Active Range of Motion     Lumbar   Flexion:  WFL  Extension:  WFL and with pain Restriction level: moderate  Left lateral flexion:  WFL  Right lateral flexion:  WFL  Left rotation:  WFL  Right rotation:  with pain Restriction level: moderate    Additional Active Range of Motion Details  Reports pain with lumbar extension and R lumbar rotation at R pelvic region    Joint Play   Joints within functional limits: L1, L2, L3 and L4     Hypomobile: L5 and S1   Mechanical Assessment    Cervical      Thoracic      Lumbar    Standing extension: repeated movements  Pain intensity: better  Pain level: decreased    Strength/Myotome Testing     Left Hip   Planes of Motion   Flexion: 4+  Extension: 4+  Abduction: 4+  Adduction: 4+    Right Hip   Planes of Motion   Flexion: 4+  Extension: 4+ (pain with resisted hip extension)  Abduction: 4  Adduction: 4+    Left Knee   Flexion: 4+  Extension: 4+    Right Knee   Flexion: 4+  Extension: 4+    Left Ankle/Foot   Dorsiflexion: 5  Plantar flexion: 5  Great toe extension: 5    Right Ankle/Foot   Dorsiflexion: 5  Plantar flexion: 5  Great toe extension: 5    Tests   Cervical   Negative vertical compression  Lumbar   Negative prone instability , vertical compression, SIJ compression and sacral spring        Right Positive slump test    Negative crossed SLR, passive SLR and quadrant  Right Pelvic Girdle/Sacrum   Negative: active SLR test      Right Hip   Positive long sit       Additional Tests Details  Slight anterior rotation of R innominate in supine position     General Comments:      Lumbar Comments  Gait: Pt ambulates with decreased stride length on the R side with a slight forward flexed posture  An antalgic gait pattern due to R sided pain              Precautions: none    Daily Treatment Diary    Date 10/10            FOTO IE -             Re-Eval IE               Manuals    Lumbar mobs             R innominate posterior rotation  JM 4x2            Side-lying lumbar mob                                                    Neuro Re-Ed     PPT             PPT with bridge             Seated nerve glide TKE             Supine nerve glide ankle pump                                                    Ther Ex    Bike              Side stepping w/ TB             Piriformis stretch              LTRs             ROSETTE             Clamshell             Standing hip abd/ext                                       Bird dog             Standing Pallof Press                                                    Ther Activity                              Gait Training                              Modalities    Heat vs Ice PRN

## 2022-10-10 NOTE — LETTER
October 10, 2022    Magali Lawson, [address]    Patient: Sukhwinder Whalen   YOB: 1990   Date of Visit: 10/10/2022     Encounter Diagnosis     ICD-10-CM    1  Lumbar radiculitis  M54 16 Ambulatory Referral to Physical Therapy       Dear Dr Marcial Short: Thank you for your recent referral of Sukhwinder Whalen  Please review the attached evaluation summary from Nasrin's recent visit  Please verify that you agree with the plan of care by signing the attached order  If you have any questions or concerns, please do not hesitate to call  I sincerely appreciate the opportunity to share in the care of one of your patients and hope to have another opportunity to work with you in the near future  Sincerely,    Mau Rincon, PT      Referring Provider:      I certify that I have read the below Plan of Care and certify the need for these services furnished under this plan of treatment while under my care  Magali Lawson MD  [address] [address]  [place]          PT Evaluation     Today's date: 10/10/2022  Patient name: Sukhwinder Whalen  : 1990  MRN: [id number]  Referring provider: Cirilo Mccormick MD  Dx:   Encounter Diagnosis     ICD-10-CM    1  Lumbar radiculitis  M54 16 Ambulatory Referral to Physical Therapy                  Assessment  Assessment details: Patient is a 27year old female that presents to outpatient physical therapy with lumbar radiculitis  Pt demonstrates signs and symptoms indicate possible sciatic involvement and decreased movement coordination  Pt exhibits limited lumbopelvic mobility and decreased functional core strength  Pt also demonstrates a positive slump test  Pt demonstrates increased pain, decreased ROM, decreased strength, decreased activity tolerance, and decreased functional activity due to pain   Following manual techniques to address innominate hypomobility, Pt reported feeling better without pain with AROM  Pt would benefit from skilled physical therapy to address noted impairments, meet patient's goals, and to return to PLOF  Thank you for this referral     Impairments: abnormal gait, abnormal or restricted ROM, activity intolerance, impaired physical strength, lacks appropriate home exercise program, pain with function, poor posture  and poor body mechanics    Symptom irritability: lowUnderstanding of Dx/Px/POC: good   Prognosis: good    Goals  STGs:   1  Pt will be able to demonstrate an increase of strength by at least 1/2 grade within 4 weeks   2  Pt will be able to achieve increased ROM by at least 25% within 4 weeks  3  Pt will be able to report pain/discomfort less than 5/10 with all work duties within 4 weeks  4  Pt will be able to sit for at least 20 minutes without an increase of symptoms within 4 weeks  5  Pt will be able to report pain less than 7/10 in the morning after waking within 4 weeks    LTGs:   1  Pt will be independent with all IADLs without pain or discomfort upon discharge  2  Pt will be independent with HEP upon discharge    3  Pt will be able to report no pain or discomfort with all work duties and recreational activities for a full day upon discharge  4  Pt will be able to demonstrate at least 4+/5 MMT to R sided hip abduction upon discharge   5   Pt will be able to report no symptoms with sitting for at least 1 hour upon discharge       Plan  Plan details: Visits: 2-3x a week   Duration: 8 weeks   Patient would benefit from: PT eval and skilled physical therapy  Planned modality interventions: cryotherapy, electrical stimulation/Russian stimulation, iontophoresis, low level laser therapy, TENS, thermotherapy: hydrocollator packs, ultrasound, traction and unattended electrical stimulation  Planned therapy interventions: abdominal trunk stabilization, joint mobilization, manual therapy, massage, muscle pump exercises, neuromuscular re-education, patient education, postural training, strengthening, stretching, therapeutic activities, therapeutic training, therapeutic exercise, home exercise program, functional ROM exercises, gait training, balance/weight bearing training, balance, ADL training, activity modification, flexibility, Gutierrez taping, breathing training and IADL retraining  Treatment plan discussed with: patient        Subjective Evaluation    History of Present Illness  Mechanism of injury: Pt states that she has been have pain/discomfort for the past few months since July  Reports that recently her symptoms have been getting worse since last Thursday  Denies NANCY  States that she gets the most pain in the morning before stretching  States that she cannot sit for more than 10 minutes without having to stand up  Indicates that she works for the [employer] and required to bend, lift, twist, and sit all day  States that she did not work over the last week due to pain, and is feeling a little bit better now       Denies numbness/tingling; Denies falls: denies changes in bowel/bladder     AGGS: bending, lifting, twisting, sitting,   EASES: medication, lidocaine pain, heat, stretches  Goals: be able to return to work without pain,       Imaging findings: Xray: normal finding  Pain  Current pain ratin  At best pain ratin  At worst pain rating: 10  Quality: dull ache and burning          Objective     Palpation     Additional Palpation Details  Pain and tenderness is palpation to R PSIS and R piriformis    Neurological Testing     Sensation     Lumbar   Left   Intact: light touch    Right   Intact: light touch    Reflexes   Left   Patellar (L4): normal (2+)  Achilles (S1): normal (2+)    Right   Patellar (L4): trace (1+)  Achilles (S1): normal (2+)    Active Range of Motion     Lumbar   Flexion:  WFL  Extension:  WFL and with pain Restriction level: moderate  Left lateral flexion:  WFL  Right lateral flexion: WFL  Left rotation:  WFL  Right rotation:  with pain Restriction level: moderate    Additional Active Range of Motion Details  Reports pain with lumbar extension and R lumbar rotation at R pelvic region    Joint Play   Joints within functional limits: L1, L2, L3 and L4     Hypomobile: L5 and S1   Mechanical Assessment    Cervical      Thoracic      Lumbar    Standing extension: repeated movements  Pain intensity: better  Pain level: decreased    Strength/Myotome Testing     Left Hip   Planes of Motion   Flexion: 4+  Extension: 4+  Abduction: 4+  Adduction: 4+    Right Hip   Planes of Motion   Flexion: 4+  Extension: 4+ (pain with resisted hip extension)  Abduction: 4  Adduction: 4+    Left Knee   Flexion: 4+  Extension: 4+    Right Knee   Flexion: 4+  Extension: 4+    Left Ankle/Foot   Dorsiflexion: 5  Plantar flexion: 5  Great toe extension: 5    Right Ankle/Foot   Dorsiflexion: 5  Plantar flexion: 5  Great toe extension: 5    Tests   Cervical   Negative vertical compression  Lumbar   Negative prone instability , vertical compression, SIJ compression and sacral spring   Right   Positive slump test    Negative crossed SLR, passive SLR and quadrant  Right Pelvic Girdle/Sacrum   Negative: active SLR test      Right Hip   Positive long sit       Additional Tests Details  Slight anterior rotation of R innominate in supine position     General Comments:      Lumbar Comments  Gait: Pt ambulates with decreased stride length on the R side with a slight forward flexed posture  An antalgic gait pattern due to R sided pain              Precautions: none    Daily Treatment Diary    Date 10/10            FOTO IE -             Re-Eval IE               Manuals    Lumbar mobs             R innominate posterior rotation  JM 4x2            Side-lying lumbar mob                                                    Neuro Re-Ed     PPT             PPT with bridge             Seated nerve glide TKE             Supine nerve glide ankle pump                                                    Ther Ex    Bike              Side stepping w/ TB             Piriformis stretch              LTRs             ROSETTE             Clamshell             Standing hip abd/ext                                       Bird dog             Standing Pallof Press                                                    Ther Activity                              Gait Training                              Modalities    Heat vs Ice PRN

## 2022-10-12 ENCOUNTER — OFFICE VISIT (OUTPATIENT)
Dept: PHYSICAL THERAPY | Facility: CLINIC | Age: 32
End: 2022-10-12
Payer: COMMERCIAL

## 2022-10-12 DIAGNOSIS — M54.16 LUMBAR RADICULITIS: Primary | ICD-10-CM

## 2022-10-12 PROBLEM — Z00.00 WELL ADULT EXAM: Status: RESOLVED | Noted: 2021-06-24 | Resolved: 2022-10-12

## 2022-10-12 PROBLEM — J06.9 ACUTE UPPER RESPIRATORY INFECTION: Status: RESOLVED | Noted: 2021-12-20 | Resolved: 2022-10-12

## 2022-10-12 PROCEDURE — 97112 NEUROMUSCULAR REEDUCATION: CPT

## 2022-10-12 PROCEDURE — 97110 THERAPEUTIC EXERCISES: CPT

## 2022-10-12 PROCEDURE — 97140 MANUAL THERAPY 1/> REGIONS: CPT

## 2022-10-12 NOTE — PROGRESS NOTES
Daily Note     Today's date: 10/12/2022  Patient name: Cornell Dye  : 1990  MRN: [id number]  Referring provider: James Young MD  Dx:   Encounter Diagnosis     ICD-10-CM    1  Lumbar radiculitis  M54 16                   Subjective: Pt states that she is doing alright  Reports that she is back to work and her back and hip were bothering her a little  Indicates that she feels that the HEP is helping  Objective: See treatment diary below      Assessment: Tolerated treatment well  Pt reported feeling better following manual techniques  Activities were also performed to increase core strength and to improve activity tolerance  Cueing required to limited lumbar lordosis with core activities  Work ergonomics were addressed  Recommended to maintain a neutral spine throughout work when possible  Patient demonstrated fatigue post treatment and would benefit from continued PT      Plan: Continue per plan of care        Precautions: none    Daily Treatment Diary    Date 10/10 10/12           FOTO IE -             Re-Eval IE               Manuals    Lumbar mobs             R innominate posterior rotation  JM 4x2 JM 4x2           Side-lying lumbar mob  JM - grade 4 (B)           Supine lumbar rotation mob  JM - grade 4 (B)           Piriformis TPR  Prone - JM                        Neuro Re-Ed     PPT  2x10           PPT with bridge  2x10           Seated nerve glide TKE  20x ea           Supine nerve glide ankle pump  20x ea           Seated PPT A/P + Lat  20x ea                                     Ther Ex    Bike   NV           Side stepping w/ TB             Piriformis stretch   30"x2 HEP          LTRs             Dead bug  3x5 taps (B) (Straight leg)          Clamshell  2x15 ea OTB           Standing hip abd/ext  Hip machine 25# abd only           Bird dog  LE only 10x ea (add UE)          Standing Pallof Press  2x10 10#                                                  Ther Activity Gait Training                              Modalities    Heat vs Ice PRN

## 2022-10-17 ENCOUNTER — OFFICE VISIT (OUTPATIENT)
Dept: PHYSICAL THERAPY | Facility: CLINIC | Age: 32
End: 2022-10-17
Payer: COMMERCIAL

## 2022-10-17 DIAGNOSIS — M54.16 LUMBAR RADICULITIS: Primary | ICD-10-CM

## 2022-10-17 PROCEDURE — 97110 THERAPEUTIC EXERCISES: CPT

## 2022-10-17 PROCEDURE — 97112 NEUROMUSCULAR REEDUCATION: CPT

## 2022-10-17 PROCEDURE — 97140 MANUAL THERAPY 1/> REGIONS: CPT

## 2022-10-17 NOTE — PROGRESS NOTES
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  Objective: See treatment diary below  Updated HEP with clamshells with OTB  Assessment: Challenged appropriately with current level of core progressions, adding more dynamic movements with progressions  Improved carryover of neutral spine with core strengthening  Plan: Continue per plan of care             Precautions: none    Daily Treatment Diary    Date 10/10 10/12 10/17          FOTO IE -             Re-Eval IE               Manuals    Lumbar mobs             R innominate posterior rotation  JM 4x2 JM 4x2 JM          Side-lying lumbar mob  JM - grade 4 (B) JM          Supine lumbar rotation mob  JM - grade 4 (B)           Piriformis TPR  Prone - JM                        Neuro Re-Ed     PPT  2x10 2x10          PPT with bridge  2x10 2x10          Seated nerve glide TKE  20x ea x20  ea          Supine nerve glide ankle pump  20x ea x20  ea          Seated PPT A/P + Lat  20x ea x20  ea                                    Ther Ex    Bike   NV 5 min  L1          Side stepping w/ TB             Piriformis stretch   30"x2 HEP          LTRs             Dead bug  3x5 taps (B) With straight leg  2x10 bilat          Clamshell  2x15 ea OTB OTB  2x15  bilat          Standing hip abd/ext  Hip machine 25# abd only Multihip abd  25#  2x15  bilat          Bird dog  LE only 10x ea UE/LE  2x10          Standing Pallof Press  2x10 10# 11 5  2x10                                                 Ther Activity                              Gait Training                              Modalities    Heat vs Ice PRN

## 2022-10-19 ENCOUNTER — APPOINTMENT (OUTPATIENT)
Dept: PHYSICAL THERAPY | Facility: CLINIC | Age: 32
End: 2022-10-19

## 2022-10-26 ENCOUNTER — APPOINTMENT (OUTPATIENT)
Dept: PHYSICAL THERAPY | Facility: CLINIC | Age: 32
End: 2022-10-26

## 2022-10-31 ENCOUNTER — APPOINTMENT (OUTPATIENT)
Dept: PHYSICAL THERAPY | Facility: CLINIC | Age: 32
End: 2022-10-31

## 2023-03-21 ENCOUNTER — TELEPHONE (OUTPATIENT)
Dept: FAMILY MEDICINE CLINIC | Facility: CLINIC | Age: 33
End: 2023-03-21

## 2023-03-21 ENCOUNTER — OFFICE VISIT (OUTPATIENT)
Dept: FAMILY MEDICINE CLINIC | Facility: CLINIC | Age: 33
End: 2023-03-21

## 2023-03-21 VITALS
TEMPERATURE: 98.2 F | HEART RATE: 61 BPM | OXYGEN SATURATION: 99 % | DIASTOLIC BLOOD PRESSURE: 64 MMHG | SYSTOLIC BLOOD PRESSURE: 98 MMHG | WEIGHT: 130 LBS | HEIGHT: 61 IN | BODY MASS INDEX: 24.55 KG/M2 | RESPIRATION RATE: 16 BRPM

## 2023-03-21 DIAGNOSIS — J45.20 MILD INTERMITTENT ASTHMA WITHOUT COMPLICATION: Primary | ICD-10-CM

## 2023-03-21 DIAGNOSIS — J45.50 SEVERE PERSISTENT ASTHMA WITHOUT COMPLICATION: ICD-10-CM

## 2023-03-21 DIAGNOSIS — F41.9 ANXIETY: ICD-10-CM

## 2023-03-21 RX ORDER — ALBUTEROL SULFATE 90 UG/1
2 AEROSOL, METERED RESPIRATORY (INHALATION) EVERY 4 HOURS PRN
Qty: 18 G | Refills: 3 | Status: SHIPPED | OUTPATIENT
Start: 2023-03-21

## 2023-03-21 NOTE — TELEPHONE ENCOUNTER
Contacted patient and advised there are several different places that offer the mindfulness- based stress reduction classes  I did give her info for [facility], they do offer a free orientation and than there is corses she can take  There are also several online classes as well  Patient aware of all this info

## 2023-03-21 NOTE — PROGRESS NOTES
Assessment/Plan: Guidance given overall  Patient wishes to start with homeopathic medication for anxiety as well as meditation etc   Continue with albuterol as needed for asthma  Patient is ready limit caffeine intake  Patient may try mindfulness based stress reduction  To consider psychology referral        Diagnoses and all orders for this visit:    Mild intermittent asthma without complication    Severe persistent asthma without complication  -     albuterol (PROVENTIL HFA,VENTOLIN HFA) 90 mcg/act inhaler; Inhale 2 puffs every 4 (four) hours as needed for wheezing    Anxiety            Subjective:        Patient ID: Dandre Schmitz is a 27 y o  female  Patient is here for multiple reasons including asthma  Patient with mild intermittent asthma  Patient use albuterol as needed  Patient notices this is worse with allergen exposure  Albuterol is not used every day  No nocturnal symptoms or chest pain shortness of breath or wheezing  Patient has difficulty being around other people and increased anxiety recently  Patient patient with social anxiety  Anxiety is daily  The following portions of the patient's history were reviewed and updated as appropriate: allergies, current medications, past family history, past medical history, past social history, past surgical history and problem list       Review of Systems   Constitutional: Negative  HENT: Negative  Eyes: Negative  Respiratory: Negative  Cardiovascular: Negative  Gastrointestinal: Negative  Endocrine: Negative  Genitourinary: Negative  Musculoskeletal: Negative  Skin: Negative  Allergic/Immunologic: Negative  Neurological: Negative  Hematological: Negative  Psychiatric/Behavioral: Negative for suicidal ideas  The patient is nervous/anxious  Objective:        Depression Screening and Follow-up Plan: Clincally patient does not have depression  No treatment is required               BP 98/64 (BP Location: Right arm, Patient Position: Sitting, Cuff Size: Adult)   Pulse 61   Temp 98 2 °F (36 8 °C) (Tympanic)   Resp 16   Ht 5' 1" (1 549 m)   Wt 59 kg (130 lb)   SpO2 99%   BMI 24 56 kg/m²          Physical Exam  Vitals and nursing note reviewed  Constitutional:       General: She is not in acute distress  Appearance: Normal appearance  She is not ill-appearing, toxic-appearing or diaphoretic  HENT:      Head: Normocephalic and atraumatic  Right Ear: Tympanic membrane, ear canal and external ear normal  There is no impacted cerumen  Left Ear: Tympanic membrane, ear canal and external ear normal  There is no impacted cerumen  Nose: Nose normal  No congestion or rhinorrhea  Mouth/Throat:      Mouth: Mucous membranes are moist       Pharynx: No oropharyngeal exudate or posterior oropharyngeal erythema  Eyes:      General: No scleral icterus  Right eye: No discharge  Left eye: No discharge  Extraocular Movements: Extraocular movements intact  Conjunctiva/sclera: Conjunctivae normal       Pupils: Pupils are equal, round, and reactive to light  Neck:      Vascular: No carotid bruit  Cardiovascular:      Rate and Rhythm: Normal rate and regular rhythm  Pulses: Normal pulses  Heart sounds: Normal heart sounds  No murmur heard  No friction rub  No gallop  Pulmonary:      Effort: Pulmonary effort is normal  No respiratory distress  Breath sounds: Normal breath sounds  No stridor  No wheezing, rhonchi or rales  Chest:      Chest wall: No tenderness  Musculoskeletal:         General: No swelling, tenderness, deformity or signs of injury  Normal range of motion  Cervical back: Normal range of motion and neck supple  No rigidity  No muscular tenderness  Right lower leg: No edema  Left lower leg: No edema  Lymphadenopathy:      Cervical: No cervical adenopathy  Skin:     General: Skin is warm and dry  Capillary Refill: Capillary refill takes less than 2 seconds  Coloration: Skin is not jaundiced  Findings: No bruising, erythema, lesion or rash  Neurological:      Mental Status: She is alert and oriented to person, place, and time  Mental status is at baseline  Cranial Nerves: No cranial nerve deficit  Sensory: No sensory deficit  Motor: No weakness  Coordination: Coordination normal       Gait: Gait normal    Psychiatric:         Behavior: Behavior normal          Thought Content:  Thought content normal          Judgment: Judgment normal       Comments: Anxious

## 2023-09-14 ENCOUNTER — ANNUAL EXAM (OUTPATIENT)
Dept: GYNECOLOGY | Facility: CLINIC | Age: 33
End: 2023-09-14
Payer: COMMERCIAL

## 2023-09-14 VITALS
HEIGHT: 61 IN | BODY MASS INDEX: 23.79 KG/M2 | SYSTOLIC BLOOD PRESSURE: 114 MMHG | DIASTOLIC BLOOD PRESSURE: 60 MMHG | WEIGHT: 126 LBS

## 2023-09-14 DIAGNOSIS — N87.0 MILD CERVICAL DYSPLASIA: ICD-10-CM

## 2023-09-14 DIAGNOSIS — Z01.419 WOMEN'S ANNUAL ROUTINE GYNECOLOGICAL EXAMINATION: Primary | ICD-10-CM

## 2023-09-14 PROCEDURE — S0612 ANNUAL GYNECOLOGICAL EXAMINA: HCPCS | Performed by: OBSTETRICS & GYNECOLOGY

## 2023-09-14 NOTE — PROGRESS NOTES
Assessment/Plan   Diagnoses and all orders for this visit:    Women's annual routine gynecological examination    Mild cervical dysplasia    1. yearly exam- Pap smear deferred, self breast awareness reviewed  2. history mild dysplasia- had Pap with positive HPV with negative Pap in October 2019 and June 2021. Plus can be done 8/12/2021 with changes suggestive of MARÍA-1 at 7:00 with negative other biopsies. Pap with HPV was negative from 9/8/2022. Pap was deferred today as per current guidelines with patient agreement. 3. previous yeast- none noted on exam today. 4. external hemorrhoid- noted on exam today. She denies any complaints, practical recommendations were previously reviewed. 5. preconceptual- not actively attempting pregnancy, but would welcome a pregnancy if it occurs. She is not using contraception for 5 years by her recollection. She does take folic acid. Recommend avoidance of smoking, drinking, drugs and stay up-to-date with vaccinations. She denies any birth defects in her or her partner's family. Her partner does have children, she does not. Did briefly discuss advanced general age and fertility concerns. She does not wish to proceed with any evaluation. 6. other-Works as a  in [employer]. My appreciation was given for her work 6 days a week. Follow-up 1 year for yearly exam or as needed. Subjective   Patient ID: Key Minaya is a 26 y.o. female. Vitals:    09/14/23 0709   BP: 114/60     Patient was seen today for yearly exam.  Please see assessment plan for details.       The following portions of the patient's history were reviewed and updated as appropriate: allergies, current medications, past family history, past medical history, past social history, past surgical history and problem list.  Past Medical History:   Diagnosis Date   • Asthma    • HPV (human papilloma virus) infection 8/26/2021   • SOB (shortness of breath)    • Urinary tract infection • Wheezing      Past Surgical History:   Procedure Laterality Date   • WISDOM TOOTH EXTRACTION       OB History    Para Term  AB Living   0 0 0 0 0 0   SAB IAB Ectopic Multiple Live Births   0 0 0 0 0       Current Outpatient Medications:   •  albuterol (PROVENTIL HFA,VENTOLIN HFA) 90 mcg/act inhaler, Inhale 2 puffs every 4 (four) hours as needed for wheezing, Disp: 18 g, Rfl: 3  No Known Allergies  Social History     Socioeconomic History   • Marital status: /Civil Union     Spouse name: None   • Number of children: None   • Years of education: None   • Highest education level: None   Occupational History   • None   Tobacco Use   • Smoking status: Never   • Smokeless tobacco: Never   Vaping Use   • Vaping Use: Never used   Substance and Sexual Activity   • Alcohol use: Yes     Comment: social   • Drug use: Never   • Sexual activity: Yes     Partners: Male     Birth control/protection: None   Other Topics Concern   • None   Social History Narrative   • None     Social Determinants of Health     Financial Resource Strain: Not on file   Food Insecurity: Not on file   Transportation Needs: Not on file   Physical Activity: Not on file   Stress: Not on file   Social Connections: Not on file   Intimate Partner Violence: Not on file   Housing Stability: Not on file     Family History   Problem Relation Age of Onset   • Asthma Mother    • Diabetes Mother    • Ulcers Mother    • Depression Mother    • Heart block Father    • Asthma Sister    • Cancer Maternal Grandfather        Review of Systems   Constitutional: Negative for chills, diaphoresis, fatigue and fever. Respiratory: Negative for apnea, cough, chest tightness, shortness of breath and wheezing. Cardiovascular: Negative for chest pain, palpitations and leg swelling. Gastrointestinal: Negative for abdominal distention, abdominal pain, anal bleeding, constipation, diarrhea, nausea, rectal pain and vomiting.    Genitourinary: Negative for difficulty urinating, dyspareunia, dysuria, frequency, hematuria, menstrual problem, pelvic pain, urgency, vaginal bleeding, vaginal discharge and vaginal pain. Musculoskeletal: Negative for arthralgias, back pain and myalgias. Skin: Negative for color change and rash. Neurological: Negative for dizziness, syncope, light-headedness, numbness and headaches. Hematological: Negative for adenopathy. Does not bruise/bleed easily. Psychiatric/Behavioral: Negative for dysphoric mood and sleep disturbance. The patient is not nervous/anxious. Objective   Physical Exam  OBGyn Exam     Objective      /60 (BP Location: Right arm, Patient Position: Sitting)   Ht 5' 1" (1.549 m)   Wt 57.2 kg (126 lb)   LMP 09/05/2023   BMI 23.81 kg/m²     General:   alert and oriented, in no acute distress   Neck: normal to inspection and palpation   Breast: normal appearance, no masses or tenderness   Heart:    Lungs:    Abdomen: soft, non-tender, without masses or organomegaly   Vulva: normal   Vagina: Without erythema or lesions or discharge. Normal   Cervix: Without lesions or discharge or cervicitis.   No Cervical motion tenderness   Uterus: normal size, anteverted, non-tender   Adnexa: no mass, fullness, tenderness   Rectum: negative, small external hemorrhoid noted    Psych:  Normal mood and affect   Skin:  Without obvious lesions   Eyes: symmetric, with normal movements and reactivity   Musculoskeletal:  Normal muscle tone and movements appreciated

## 2023-09-28 ENCOUNTER — APPOINTMENT (OUTPATIENT)
Dept: LAB | Age: 33
End: 2023-09-28
Payer: COMMERCIAL

## 2023-09-28 ENCOUNTER — OFFICE VISIT (OUTPATIENT)
Dept: FAMILY MEDICINE CLINIC | Facility: CLINIC | Age: 33
End: 2023-09-28
Payer: COMMERCIAL

## 2023-09-28 VITALS
OXYGEN SATURATION: 98 % | BODY MASS INDEX: 24.2 KG/M2 | TEMPERATURE: 98 F | WEIGHT: 128.2 LBS | DIASTOLIC BLOOD PRESSURE: 78 MMHG | HEART RATE: 67 BPM | SYSTOLIC BLOOD PRESSURE: 120 MMHG | HEIGHT: 61 IN

## 2023-09-28 DIAGNOSIS — Z00.00 ANNUAL PHYSICAL EXAM: Primary | ICD-10-CM

## 2023-09-28 DIAGNOSIS — Z00.00 ANNUAL PHYSICAL EXAM: ICD-10-CM

## 2023-09-28 LAB
25(OH)D3 SERPL-MCNC: 33.2 NG/ML (ref 30–100)
ALBUMIN SERPL BCP-MCNC: 4.2 G/DL (ref 3.5–5)
ALP SERPL-CCNC: 45 U/L (ref 34–104)
ALT SERPL W P-5'-P-CCNC: 10 U/L (ref 7–52)
ANION GAP SERPL CALCULATED.3IONS-SCNC: 4 MMOL/L
AST SERPL W P-5'-P-CCNC: 17 U/L (ref 13–39)
BILIRUB SERPL-MCNC: 0.47 MG/DL (ref 0.2–1)
BUN SERPL-MCNC: 11 MG/DL (ref 5–25)
CALCIUM SERPL-MCNC: 9.4 MG/DL (ref 8.4–10.2)
CHLORIDE SERPL-SCNC: 107 MMOL/L (ref 96–108)
CHOLEST SERPL-MCNC: 166 MG/DL
CO2 SERPL-SCNC: 28 MMOL/L (ref 21–32)
CREAT SERPL-MCNC: 0.65 MG/DL (ref 0.6–1.3)
ERYTHROCYTE [DISTWIDTH] IN BLOOD BY AUTOMATED COUNT: 12.5 % (ref 11.6–15.1)
EST. AVERAGE GLUCOSE BLD GHB EST-MCNC: 111 MG/DL
GFR SERPL CREATININE-BSD FRML MDRD: 115 ML/MIN/1.73SQ M
GLUCOSE P FAST SERPL-MCNC: 96 MG/DL (ref 65–99)
HBA1C MFR BLD: 5.5 %
HCT VFR BLD AUTO: 41.8 % (ref 34.8–46.1)
HDLC SERPL-MCNC: 68 MG/DL
HGB BLD-MCNC: 14.2 G/DL (ref 11.5–15.4)
LDLC SERPL CALC-MCNC: 90 MG/DL (ref 0–100)
MCH RBC QN AUTO: 30.7 PG (ref 26.8–34.3)
MCHC RBC AUTO-ENTMCNC: 34 G/DL (ref 31.4–37.4)
MCV RBC AUTO: 91 FL (ref 82–98)
NONHDLC SERPL-MCNC: 98 MG/DL
PLATELET # BLD AUTO: 351 THOUSANDS/UL (ref 149–390)
PMV BLD AUTO: 9.1 FL (ref 8.9–12.7)
POTASSIUM SERPL-SCNC: 4.6 MMOL/L (ref 3.5–5.3)
PROT SERPL-MCNC: 7.1 G/DL (ref 6.4–8.4)
RBC # BLD AUTO: 4.62 MILLION/UL (ref 3.81–5.12)
SODIUM SERPL-SCNC: 139 MMOL/L (ref 135–147)
TRIGL SERPL-MCNC: 38 MG/DL
TSH SERPL DL<=0.05 MIU/L-ACNC: 1.38 UIU/ML (ref 0.45–4.5)
WBC # BLD AUTO: 4.52 THOUSAND/UL (ref 4.31–10.16)

## 2023-09-28 PROCEDURE — 84443 ASSAY THYROID STIM HORMONE: CPT

## 2023-09-28 PROCEDURE — 99395 PREV VISIT EST AGE 18-39: CPT | Performed by: FAMILY MEDICINE

## 2023-09-28 PROCEDURE — 85027 COMPLETE CBC AUTOMATED: CPT

## 2023-09-28 PROCEDURE — 36415 COLL VENOUS BLD VENIPUNCTURE: CPT

## 2023-09-28 PROCEDURE — 80061 LIPID PANEL: CPT

## 2023-09-28 PROCEDURE — 83036 HEMOGLOBIN GLYCOSYLATED A1C: CPT

## 2023-09-28 PROCEDURE — 80053 COMPREHEN METABOLIC PANEL: CPT

## 2023-09-28 PROCEDURE — 82306 VITAMIN D 25 HYDROXY: CPT

## 2023-09-28 NOTE — PROGRESS NOTES
[facility]    NAME: Rochelle Vargas  AGE: 26 y.o. SEX: female  : 1990     DATE: 2023     Assessment and Plan:     Problem List Items Addressed This Visit    None  Visit Diagnoses     Annual physical exam    -  Primary    Relevant Orders    Lipid panel    Comprehensive metabolic panel    TSH, 3rd generation    Vitamin D 25 hydroxy    CBC and Platelet    Hemoglobin A1C          Immunizations and preventive care screenings were discussed with patient today. Appropriate education was printed on patient's after visit summary. Counseling:  Alcohol/drug use: discussed moderation in alcohol intake, the recommendations for healthy alcohol use, and avoidance of illicit drug use. Dental Health: discussed importance of regular tooth brushing, flossing, and dental visits. Injury prevention: discussed safety/seat belts, safety helmets, smoke detectors, carbon dioxide detectors, and smoking near bedding or upholstery. Sexual health: discussed sexually transmitted diseases, partner selection, use of condoms, avoidance of unintended pregnancy, and contraceptive alternatives. Exercise: the importance of regular exercise/physical activity was discussed. Recommend exercise 3-5 times per week for at least 30 minutes. Depression Screening and Follow-up Plan: Patient was screened for depression during today's encounter. They screened negative with a PHQ-2 score of 0. Return in 1 year (on 2024). Chief Complaint:     Chief Complaint   Patient presents with   • Physical Exam     PE annual;   wants to discuss bump R side closer to the back        History of Present Illness:     Adult Annual Physical   Patient here for a comprehensive physical exam. The patient reports no problems. Diet and Physical Activity  Diet/Nutrition: well balanced diet and consuming 3-5 servings of fruits/vegetables daily. Exercise: moderate cardiovascular exercise. Depression Screening  PHQ-2/9 Depression Screening    Little interest or pleasure in doing things: 0 - not at all  Feeling down, depressed, or hopeless: 0 - not at all  PHQ-2 Score: 0  PHQ-2 Interpretation: Negative depression screen       General Health  Sleep: sleeps well. Hearing: normal - bilateral.  Vision: no vision problems. Dental: regular dental visits. /GYN Health  Last menstrual period: 09/2023   Contraceptive method: none. History of STDs?: no.     Review of Systems:     Review of Systems   Constitutional: Negative. HENT: Negative. Eyes: Negative. Respiratory: Negative. Cardiovascular: Negative. Gastrointestinal: Negative. Endocrine: Negative. Genitourinary: Negative. Musculoskeletal: Negative. Skin: Negative. Allergic/Immunologic: Negative. Neurological: Negative. Hematological: Negative. Psychiatric/Behavioral: Negative. All other systems reviewed and are negative.      Past Medical History:     Past Medical History:   Diagnosis Date   • Asthma    • HPV (human papilloma virus) infection 8/26/2021   • SOB (shortness of breath)    • Urinary tract infection    • Wheezing       Past Surgical History:     Past Surgical History:   Procedure Laterality Date   • WISDOM TOOTH EXTRACTION        Social History:     Social History     Socioeconomic History   • Marital status: /Civil Union     Spouse name: None   • Number of children: None   • Years of education: None   • Highest education level: None   Occupational History   • None   Tobacco Use   • Smoking status: Never   • Smokeless tobacco: Never   Vaping Use   • Vaping Use: Never used   Substance and Sexual Activity   • Alcohol use: Yes     Comment: social   • Drug use: Never   • Sexual activity: Yes     Partners: Male     Birth control/protection: None   Other Topics Concern   • None   Social History Narrative   • None     Social Determinants of Health     Financial Resource Strain: Not on file   Food Insecurity: Not on file   Transportation Needs: Not on file   Physical Activity: Not on file   Stress: Not on file   Social Connections: Not on file   Intimate Partner Violence: Not on file   Housing Stability: Not on file      Family History:     Family History   Problem Relation Age of Onset   • Asthma Mother    • Diabetes Mother    • Ulcers Mother    • Depression Mother    • Heart block Father    • Asthma Sister    • Cancer Maternal Grandfather       Current Medications:     Current Outpatient Medications   Medication Sig Dispense Refill   • albuterol (PROVENTIL HFA,VENTOLIN HFA) 90 mcg/act inhaler Inhale 2 puffs every 4 (four) hours as needed for wheezing 18 g 3     No current facility-administered medications for this visit. Allergies:     No Known Allergies   Physical Exam:     /78 (BP Location: Left arm, Patient Position: Sitting, Cuff Size: Standard)   Pulse 67   Temp 98 °F (36.7 °C) (Temporal)   Ht 5' 1" (1.549 m)   Wt 58.2 kg (128 lb 3.2 oz)   LMP 09/05/2023   SpO2 98%   BMI 24.22 kg/m²     Physical Exam  Vitals and nursing note reviewed. Constitutional:       Appearance: She is well-developed. HENT:      Head: Normocephalic. Eyes:      Pupils: Pupils are equal, round, and reactive to light. Cardiovascular:      Rate and Rhythm: Normal rate and regular rhythm. Heart sounds: Normal heart sounds. Pulmonary:      Effort: Pulmonary effort is normal.      Breath sounds: Normal breath sounds. Abdominal:      General: Bowel sounds are normal.      Palpations: Abdomen is soft. Musculoskeletal:      Cervical back: Normal range of motion and neck supple. Skin:     General: Skin is warm and dry. Capillary Refill: Capillary refill takes less than 2 seconds. Neurological:      Mental Status: She is alert and oriented to person, place, and time.    Psychiatric:         Behavior: Behavior normal.          Warren Clark [address]

## 2024-02-21 PROBLEM — Z00.00 ROUTINE ADULT HEALTH MAINTENANCE: Status: RESOLVED | Noted: 2019-10-09 | Resolved: 2024-02-21

## 2024-02-21 PROBLEM — N30.01 ACUTE CYSTITIS WITH HEMATURIA: Status: RESOLVED | Noted: 2019-10-08 | Resolved: 2024-02-21

## 2024-09-02 DIAGNOSIS — J45.50 SEVERE PERSISTENT ASTHMA WITHOUT COMPLICATION: ICD-10-CM

## 2024-09-03 RX ORDER — ALBUTEROL SULFATE 90 UG/1
2 AEROSOL, METERED RESPIRATORY (INHALATION) EVERY 4 HOURS PRN
Qty: 18 G | Refills: 0 | Status: SHIPPED | OUTPATIENT
Start: 2024-09-03

## 2024-09-14 ENCOUNTER — OFFICE VISIT (OUTPATIENT)
Dept: URGENT CARE | Facility: CLINIC | Age: 34
End: 2024-09-14
Payer: COMMERCIAL

## 2024-09-14 ENCOUNTER — APPOINTMENT (OUTPATIENT)
Dept: RADIOLOGY | Facility: CLINIC | Age: 34
End: 2024-09-14
Payer: COMMERCIAL

## 2024-09-14 VITALS
OXYGEN SATURATION: 98 % | HEIGHT: 61 IN | BODY MASS INDEX: 24.55 KG/M2 | WEIGHT: 130 LBS | SYSTOLIC BLOOD PRESSURE: 116 MMHG | TEMPERATURE: 96.8 F | DIASTOLIC BLOOD PRESSURE: 72 MMHG | HEART RATE: 63 BPM | RESPIRATION RATE: 18 BRPM

## 2024-09-14 DIAGNOSIS — S86.911A STRAIN OF RIGHT KNEE, INITIAL ENCOUNTER: ICD-10-CM

## 2024-09-14 DIAGNOSIS — M25.561 ACUTE PAIN OF RIGHT KNEE: ICD-10-CM

## 2024-09-14 DIAGNOSIS — M25.561 ACUTE PAIN OF RIGHT KNEE: Primary | ICD-10-CM

## 2024-09-14 PROCEDURE — 99213 OFFICE O/P EST LOW 20 MIN: CPT | Performed by: NURSE PRACTITIONER

## 2024-09-14 PROCEDURE — 73562 X-RAY EXAM OF KNEE 3: CPT

## 2024-09-14 NOTE — PROGRESS NOTES
"NAME: Nasrin Montes is a 34 y.o. female  : 1990    MRN: [id number]    /72   Pulse 63   Temp (!) 96.8 °F (36 °C) (Tympanic)   Resp 18   Ht 5' 1\" (1.549 m)   Wt 59 kg (130 lb)   LMP 2024 (Approximate)   SpO2 98%   BMI 24.56 kg/m²     12:42 PM    Assessment and Plan   Acute pain of right knee [M25.561]  1. Acute pain of right knee  XR knee 3 vw right non injury      2. Strain of right knee, initial encounter            Nasrin was seen today for knee pain.    Diagnoses and all orders for this visit:    Acute pain of right knee  -     XR knee 3 vw right non injury; Future    Strain of right knee, initial encounter        Patient Instructions     Patient Instructions   Take tylenol and motrin for pain, wear ace wrap for comfort when needed, ice to the right knee, elevate it when needed.     Xray done today, no abnormalities present, final report pending.     Proceed to the nearest ER if symptoms worsen, Follow up with your PCP  Continue to social distance, wash your hands, and wear your masks. Please continue to follow the CDC.gov guidelines daily for they are subject to change on COVID-19    Chief Complaint     Chief Complaint   Patient presents with    Knee Pain     Right knee. Yesterday, not sure what happened. Was walking all day yesterday outside. Ibuprofen. Not swollen or bruised. Any movement makes the pain worse.         History of Present Illness     Patient has right knee pain after walking on uneven grounds and grass yesterday. She does this often however today she has pain to the medial aspect of the right knee. She is able to walk on it without a limp, there is no swelling noted at this time or open wounds. She has good ROM, no cracking or popping sound present           Review of Systems   Review of Systems   Musculoskeletal:  Positive for arthralgias (right knee) and myalgias. Negative for joint swelling. Neck stiffness: pain to the medial aspect intermittently.  Skin:  " "Negative for pallor and rash.         Current Medications       Current Outpatient Medications:     albuterol (PROVENTIL HFA,VENTOLIN HFA) 90 mcg/act inhaler, Inhale 2 puffs every 4 (four) hours as needed for wheezing, Disp: 18 g, Rfl: 0    Current Allergies     Allergies as of 09/14/2024    (No Known Allergies)              Past Medical History:   Diagnosis Date    Asthma     HPV (human papilloma virus) infection 8/26/2021    SOB (shortness of breath)     Urinary tract infection     Wheezing        Past Surgical History:   Procedure Laterality Date    WISDOM TOOTH EXTRACTION         Family History   Problem Relation Age of Onset    Asthma Mother     Diabetes Mother     Ulcers Mother     Depression Mother     Heart block Father     Asthma Sister     Cancer Maternal Grandfather          Medications have been verified.    The following portions of the patient's history were reviewed and updated as appropriate: allergies, current medications, past family history, past medical history, past social history, past surgical history and problem list.    Objective   /72   Pulse 63   Temp (!) 96.8 °F (36 °C) (Tympanic)   Resp 18   Ht 5' 1\" (1.549 m)   Wt 59 kg (130 lb)   LMP 08/17/2024 (Approximate)   SpO2 98%   BMI 24.56 kg/m²      Physical Exam     Physical Exam  Constitutional:       Appearance: Normal appearance.   Musculoskeletal:         General: Tenderness present. No swelling, deformity or signs of injury.      Right knee: No swelling, deformity, effusion, erythema, ecchymosis, lacerations or bony tenderness. Normal range of motion. No tenderness. Normal alignment.      Instability Tests: Anterior drawer test negative. Posterior drawer test negative.      Left knee: Normal.      Right lower leg: Normal. No edema.      Left lower leg: Normal. No edema.      Comments: Tenderness when walking long distance, NTTP the R knee     Neurological:      Mental Status: She is alert.         Ace wrap applied to pts " "right knee by me, tolerated well, knee xray done negative for fx       Note: Portions of this record may have been created with voice recognition software. Occasional wrong word or \"sound a like\" substitutions may have occurred due to the inherent limitations of voice recognition software. Please read the chart carefully and recognize, using context, where substitutions have occurred.*    FRANCIS Vega  "

## 2024-09-14 NOTE — LETTER
September 14, 2024     Patient: Nasrin Montes  YOB: 1990  Date of Visit: 9/14/2024      To Whom it May Concern:    Nasrin Montes is under my professional care. Nasrin was seen in my office on 9/14/2024. Please excuse her from work today.  Nasrin may return to work on 9/15/2024 as long as symptoms improve if not please excuse till next working day.       Sincerely,          FRANCIS Vega        CC: No Recipients

## 2024-09-14 NOTE — PATIENT INSTRUCTIONS
Take tylenol and motrin for pain, wear ace wrap for comfort when needed, ice to the right knee, elevate it when needed.     Xray done today, no abnormalities present, final report pending.

## 2024-09-26 ENCOUNTER — ANNUAL EXAM (OUTPATIENT)
Dept: GYNECOLOGY | Facility: CLINIC | Age: 34
End: 2024-09-26
Payer: COMMERCIAL

## 2024-09-26 VITALS
HEIGHT: 61 IN | DIASTOLIC BLOOD PRESSURE: 70 MMHG | WEIGHT: 122.6 LBS | SYSTOLIC BLOOD PRESSURE: 114 MMHG | BODY MASS INDEX: 23.15 KG/M2

## 2024-09-26 DIAGNOSIS — K64.8 OTHER HEMORRHOIDS: ICD-10-CM

## 2024-09-26 DIAGNOSIS — Z11.51 SCREENING FOR HUMAN PAPILLOMAVIRUS (HPV): ICD-10-CM

## 2024-09-26 DIAGNOSIS — N87.0 MILD CERVICAL DYSPLASIA: ICD-10-CM

## 2024-09-26 DIAGNOSIS — B97.7 HPV (HUMAN PAPILLOMA VIRUS) INFECTION: ICD-10-CM

## 2024-09-26 DIAGNOSIS — Z01.419 WOMEN'S ANNUAL ROUTINE GYNECOLOGICAL EXAMINATION: Primary | ICD-10-CM

## 2024-09-26 PROCEDURE — G0476 HPV COMBO ASSAY CA SCREEN: HCPCS | Performed by: OBSTETRICS & GYNECOLOGY

## 2024-09-26 PROCEDURE — G0145 SCR C/V CYTO,THINLAYER,RESCR: HCPCS | Performed by: OBSTETRICS & GYNECOLOGY

## 2024-09-26 PROCEDURE — S0612 ANNUAL GYNECOLOGICAL EXAMINA: HCPCS | Performed by: OBSTETRICS & GYNECOLOGY

## 2024-09-26 NOTE — PROGRESS NOTES
Assessment & Plan   Diagnoses and all orders for this visit:    Women's annual routine gynecological examination    Mild cervical dysplasia    HPV (human papilloma virus) infection    Other hemorrhoids    1.  Yearly exam-Pap smear done with HPV testing, self breast awareness reviewed  2. history of mild dysplasia-had positive HPV with negative Pap from October 2019 and June 2021.  Colposcopy was done 8/12/2021 with MARÍA-1 noted at 7:00 with other biopsies negative.  Pap with HPV follow-up was negative from 9/8/2022.  Pap with HPV done today, to inform the patient the findings.  Aware of need for colposcopy if either HPV is positive or Pap is abnormal.  3.  History of yeast-denies any symptoms, exam today is negative.  4.  External hemorrhoid-noted on exam.  Patient denies symptoms.  Practical recommendations were reviewed.  5. Contraception/preconceptual-not actively attempting pregnancy at this time.  Declines need for contraception.  Encouraged to take vitamin with folic acid in case she would conceive.  6. other-Works as a  in [place].  She works 6 days/week.  Her  is in his 60s, diagnosed with prostate cancer and getting ready for treatment.  My support and prayers were given.  Follow-up 1 year for yearly exam or as needed.    Subjective   Patient ID: Nasrin Montes is a 34 y.o. female.    Vitals:    09/26/24 0716   BP: 114/70     Patient was seen today for yearly exam.  Please see assessment plan for details.        The following portions of the patient's history were reviewed and updated as appropriate: allergies, current medications, past family history, past medical history, past social history, past surgical history, and problem list.  Past Medical History:   Diagnosis Date    Asthma     HPV (human papilloma virus) infection 8/26/2021    SOB (shortness of breath)     Urinary tract infection     Wheezing      Past Surgical History:   Procedure Laterality Date    WISDOM TOOTH  EXTRACTION       OB History    Para Term  AB Living   0 0 0 0 0 0   SAB IAB Ectopic Multiple Live Births   0 0 0 0 0       Current Outpatient Medications:     albuterol (PROVENTIL HFA,VENTOLIN HFA) 90 mcg/act inhaler, Inhale 2 puffs every 4 (four) hours as needed for wheezing, Disp: 18 g, Rfl: 0  No Known Allergies  Social History     Socioeconomic History    Marital status: /Civil Union     Spouse name: None    Number of children: None    Years of education: None    Highest education level: None   Occupational History    None   Tobacco Use    Smoking status: Never    Smokeless tobacco: Never   Vaping Use    Vaping status: Never Used   Substance and Sexual Activity    Alcohol use: Yes     Comment: social    Drug use: Never    Sexual activity: Yes     Partners: Male     Birth control/protection: None   Other Topics Concern    None   Social History Narrative    None     Social Determinants of Health     Financial Resource Strain: Not on file   Food Insecurity: Not on file   Transportation Needs: Not on file   Physical Activity: Not on file   Stress: Not on file   Social Connections: Not on file   Intimate Partner Violence: Not on file   Housing Stability: Not on file     Family History   Problem Relation Age of Onset    Asthma Mother     Diabetes Mother     Ulcers Mother     Depression Mother     Heart block Father     Asthma Sister     Cancer Maternal Grandfather        Review of Systems   Constitutional:  Negative for chills, diaphoresis, fatigue and fever.   Respiratory:  Negative for apnea, cough, chest tightness, shortness of breath and wheezing.    Cardiovascular:  Negative for chest pain, palpitations and leg swelling.   Gastrointestinal:  Negative for abdominal distention, abdominal pain, anal bleeding, constipation, diarrhea, nausea, rectal pain and vomiting.   Genitourinary:  Negative for difficulty urinating, dyspareunia, dysuria, frequency, hematuria, menstrual problem, pelvic pain,  "urgency, vaginal bleeding, vaginal discharge and vaginal pain.   Musculoskeletal:  Negative for arthralgias, back pain and myalgias.   Skin:  Negative for color change and rash.   Neurological:  Negative for dizziness, syncope, light-headedness, numbness and headaches.   Hematological:  Negative for adenopathy. Does not bruise/bleed easily.   Psychiatric/Behavioral:  Negative for dysphoric mood and sleep disturbance. The patient is not nervous/anxious.        Objective   Physical Exam  OBGyn Exam     Objective      /70 (BP Location: Right arm, Patient Position: Sitting)   Ht 5' 1\" (1.549 m)   Wt 55.6 kg (122 lb 9.6 oz)   LMP 09/15/2024 (Approximate)   BMI 23.17 kg/m²     General:   alert and oriented, in no acute distress   Neck: normal to inspection and palpation   Breast: normal appearance, no masses or tenderness   Heart:    Lungs:    Abdomen: soft, non-tender, without masses or organomegaly   Vulva: normal   Vagina: Without erythema or lesions or discharge.  Normal   Cervix: Without lesions or discharge or cervicitis.  No Cervical motion tenderness   Uterus: normal size, anteverted, non-tender   Adnexa: no mass, fullness, tenderness   Rectum: negative, external hemorrhoids noted    Psych:  Normal mood and affect   Skin:  Without obvious lesions   Eyes: symmetric, with normal movements and reactivity   Musculoskeletal:  Normal muscle tone and movements appreciated       "

## 2024-10-03 ENCOUNTER — OFFICE VISIT (OUTPATIENT)
Age: 34
End: 2024-10-03
Payer: COMMERCIAL

## 2024-10-03 VITALS
RESPIRATION RATE: 14 BRPM | OXYGEN SATURATION: 99 % | DIASTOLIC BLOOD PRESSURE: 68 MMHG | SYSTOLIC BLOOD PRESSURE: 100 MMHG | HEIGHT: 61 IN | TEMPERATURE: 97.6 F | WEIGHT: 129 LBS | BODY MASS INDEX: 24.35 KG/M2 | HEART RATE: 76 BPM

## 2024-10-03 DIAGNOSIS — Z00.00 WELL ADULT EXAM: Primary | ICD-10-CM

## 2024-10-03 LAB
LAB AP GYN PRIMARY INTERPRETATION: NORMAL
LAB AP LMP: NORMAL
Lab: NORMAL

## 2024-10-03 PROCEDURE — 99395 PREV VISIT EST AGE 18-39: CPT | Performed by: FAMILY MEDICINE

## 2024-10-03 NOTE — PROGRESS NOTES
"Assessment/Plan: Wellness exam done.  Vaccines up-to-date.  Labs up-to-date.  Patient up-to-date with gynecologist.  No breast related issues.  No family history of colorectal cancer.  Patient will exercise and diet appropriate.  Patient will follow-up yearly or as needed.       Diagnoses and all orders for this visit:    Well adult exam            Subjective:        Patient ID: Nasrin Montes is a 34 y.o. female.      Patient is here for wellness exam for labs vaccines reviewed.  Patient up-to-date with gynecologic care.  No breast related issues.  No family history of colorectal cancer.  Patient doing well overall.  No complaints offered at this time.          The following portions of the patient's history were reviewed and updated as appropriate: allergies, current medications, past family history, past medical history, past social history, past surgical history and problem list.      Review of Systems   Constitutional: Negative.    HENT: Negative.     Eyes: Negative.    Respiratory: Negative.     Cardiovascular: Negative.    Gastrointestinal: Negative.    Endocrine: Negative.    Genitourinary: Negative.    Musculoskeletal: Negative.    Skin: Negative.    Allergic/Immunologic: Negative.    Neurological: Negative.    Hematological: Negative.    Psychiatric/Behavioral: Negative.             Objective:        Depression Screening and Follow-up Plan: Patient was screened for depression during today's encounter. They screened negative with a PHQ-2 score of 0.            /68 (BP Location: Left arm, Patient Position: Sitting, Cuff Size: Adult)   Pulse 76   Temp 97.6 °F (36.4 °C) (Tympanic)   Resp 14   Ht 5' 1\" (1.549 m)   Wt 58.5 kg (129 lb)   LMP 09/15/2024 (Approximate)   SpO2 99%   BMI 24.37 kg/m²          Physical Exam  Vitals and nursing note reviewed.   Constitutional:       General: She is not in acute distress.     Appearance: Normal appearance. She is not ill-appearing, toxic-appearing or " diaphoretic.   HENT:      Head: Normocephalic and atraumatic.      Right Ear: Tympanic membrane, ear canal and external ear normal. There is no impacted cerumen.      Left Ear: Tympanic membrane, ear canal and external ear normal. There is no impacted cerumen.      Nose: Nose normal. No congestion or rhinorrhea.      Mouth/Throat:      Mouth: Mucous membranes are moist.      Pharynx: No oropharyngeal exudate or posterior oropharyngeal erythema.   Eyes:      General: No scleral icterus.        Right eye: No discharge.         Left eye: No discharge.   Neck:      Vascular: No carotid bruit.   Cardiovascular:      Rate and Rhythm: Normal rate and regular rhythm.      Pulses: Normal pulses.      Heart sounds: Normal heart sounds. No murmur heard.     No friction rub. No gallop.   Pulmonary:      Effort: Pulmonary effort is normal. No respiratory distress.      Breath sounds: Normal breath sounds. No stridor. No wheezing, rhonchi or rales.   Chest:      Chest wall: No tenderness.   Abdominal:      General: Abdomen is flat. Bowel sounds are normal. There is no distension.      Palpations: Abdomen is soft.      Tenderness: There is no abdominal tenderness. There is no guarding or rebound.   Musculoskeletal:         General: No swelling, tenderness, deformity or signs of injury. Normal range of motion.      Cervical back: Normal range of motion and neck supple. No rigidity. No muscular tenderness.      Right lower leg: No edema.      Left lower leg: No edema.   Lymphadenopathy:      Cervical: No cervical adenopathy.   Skin:     General: Skin is warm and dry.      Capillary Refill: Capillary refill takes less than 2 seconds.      Coloration: Skin is not jaundiced.      Findings: No bruising, erythema, lesion or rash.   Neurological:      Mental Status: She is alert and oriented to person, place, and time. Mental status is at baseline.      Cranial Nerves: No cranial nerve deficit.      Sensory: No sensory deficit.       Motor: No weakness.      Coordination: Coordination normal.      Gait: Gait normal.   Psychiatric:         Mood and Affect: Mood normal.         Behavior: Behavior normal.         Thought Content: Thought content normal.         Judgment: Judgment normal.

## 2024-11-02 PROBLEM — Z00.00 WELL ADULT EXAM: Status: RESOLVED | Noted: 2024-10-03 | Resolved: 2024-11-02

## 2025-01-09 ENCOUNTER — OFFICE VISIT (OUTPATIENT)
Age: 35
End: 2025-01-09
Payer: COMMERCIAL

## 2025-01-09 VITALS
HEART RATE: 65 BPM | WEIGHT: 127.6 LBS | BODY MASS INDEX: 24.09 KG/M2 | DIASTOLIC BLOOD PRESSURE: 70 MMHG | HEIGHT: 61 IN | OXYGEN SATURATION: 99 % | TEMPERATURE: 97.8 F | SYSTOLIC BLOOD PRESSURE: 112 MMHG

## 2025-01-09 DIAGNOSIS — J45.40 MODERATE PERSISTENT ASTHMA WITHOUT COMPLICATION: Primary | ICD-10-CM

## 2025-01-09 DIAGNOSIS — J45.50 SEVERE PERSISTENT ASTHMA WITHOUT COMPLICATION: ICD-10-CM

## 2025-01-09 DIAGNOSIS — J30.1 ALLERGIC RHINITIS DUE TO POLLEN, UNSPECIFIED SEASONALITY: ICD-10-CM

## 2025-01-09 PROCEDURE — 99214 OFFICE O/P EST MOD 30 MIN: CPT | Performed by: FAMILY MEDICINE

## 2025-01-09 RX ORDER — LORATADINE 10 MG/1
10 TABLET ORAL DAILY
Qty: 90 TABLET | Refills: 3 | Status: SHIPPED | OUTPATIENT
Start: 2025-01-09

## 2025-01-09 RX ORDER — FLUTICASONE FUROATE AND VILANTEROL 200; 25 UG/1; UG/1
1 POWDER RESPIRATORY (INHALATION) DAILY
Qty: 60 BLISTER | Refills: 5 | Status: SHIPPED | OUTPATIENT
Start: 2025-01-09 | End: 2025-07-08

## 2025-01-09 NOTE — PROGRESS NOTES
Assessment/Plan:       Diagnoses and all orders for this visit:    Moderate persistent asthma without complication  Comments:  Patient will start Breo as directed.  Albuterol as needed.  Patient go for PFTs.  Orders:  -     fluticasone-vilanterol 200-25 mcg/actuation inhaler; Inhale 1 puff daily Rinse mouth after use.  -     Complete PFT with post bronchodilator; Future    Allergic rhinitis due to pollen, unspecified seasonality  Comments:  Patient will start loratadine as directed.  Orders:  -     loratadine (CLARITIN) 10 mg tablet; Take 1 tablet (10 mg total) by mouth daily    Severe persistent asthma without complication            Subjective:        Patient ID: Nasrin Montes is a 34 y.o. female.      Patient is here with to follow-up on asthma.  Patient with 2 asthma exacerbations recently.  Patient exposed to cats recently.  Patient with history of allergic rhinitis.  No new change in diet.  No exacerbation with cold weather.  Fumes as well as being in the basement will cause flare of asthma.  Occasional rhinorrhea.  Otherwise patient asymptomatic at this time.    Asthma  Associated symptoms include rhinorrhea. Her past medical history is significant for asthma.         The following portions of the patient's history were reviewed and updated as appropriate: allergies, current medications, past family history, past medical history, past social history, past surgical history and problem list.      Review of Systems   Constitutional: Negative.    HENT:  Positive for rhinorrhea.    Eyes: Negative.    Respiratory: Negative.     Cardiovascular: Negative.    Gastrointestinal: Negative.    Endocrine: Negative.    Genitourinary: Negative.    Musculoskeletal: Negative.    Skin: Negative.    Allergic/Immunologic: Negative.    Neurological: Negative.    Hematological: Negative.    Psychiatric/Behavioral: Negative.             Objective:        Depression Screening and Follow-up Plan: Patient was screened for  "depression during today's encounter. They screened negative with a PHQ-2 score of 0.            /70 (BP Location: Right arm, Patient Position: Sitting, Cuff Size: Standard)   Pulse 65   Temp 97.8 °F (36.6 °C) (Temporal)   Ht 5' 1\" (1.549 m)   Wt 57.9 kg (127 lb 9.6 oz)   SpO2 99%   BMI 24.11 kg/m²          Physical Exam  Vitals and nursing note reviewed.   Constitutional:       General: She is not in acute distress.     Appearance: Normal appearance. She is not ill-appearing, toxic-appearing or diaphoretic.   HENT:      Head: Normocephalic and atraumatic.      Right Ear: Tympanic membrane, ear canal and external ear normal. There is no impacted cerumen.      Left Ear: Tympanic membrane, ear canal and external ear normal. There is no impacted cerumen.      Nose: Nose normal. No congestion or rhinorrhea.      Mouth/Throat:      Mouth: Mucous membranes are moist.      Pharynx: No oropharyngeal exudate or posterior oropharyngeal erythema.   Eyes:      General: No scleral icterus.        Right eye: No discharge.         Left eye: No discharge.   Neck:      Vascular: No carotid bruit.   Cardiovascular:      Rate and Rhythm: Normal rate and regular rhythm.      Pulses: Normal pulses.      Heart sounds: Normal heart sounds. No murmur heard.     No friction rub. No gallop.   Pulmonary:      Effort: Pulmonary effort is normal. No respiratory distress.      Breath sounds: Normal breath sounds. No stridor. No wheezing, rhonchi or rales.   Chest:      Chest wall: No tenderness.   Musculoskeletal:         General: No swelling, tenderness, deformity or signs of injury. Normal range of motion.      Cervical back: Normal range of motion and neck supple. No rigidity. No muscular tenderness.      Right lower leg: No edema.      Left lower leg: No edema.   Lymphadenopathy:      Cervical: No cervical adenopathy.   Skin:     General: Skin is warm and dry.      Capillary Refill: Capillary refill takes less than 2 seconds.      " Coloration: Skin is not jaundiced.      Findings: No bruising, erythema, lesion or rash.   Neurological:      Mental Status: She is alert and oriented to person, place, and time.      Cranial Nerves: No cranial nerve deficit.      Sensory: No sensory deficit.      Motor: No weakness.      Coordination: Coordination normal.      Gait: Gait normal.   Psychiatric:         Mood and Affect: Mood normal.         Behavior: Behavior normal.         Thought Content: Thought content normal.         Judgment: Judgment normal.

## 2025-01-14 RX ORDER — ALBUTEROL SULFATE 0.83 MG/ML
2.5 SOLUTION RESPIRATORY (INHALATION) ONCE
Status: COMPLETED | OUTPATIENT
Start: 2025-01-14 | End: 2025-01-16

## 2025-01-16 ENCOUNTER — HOSPITAL ENCOUNTER (OUTPATIENT)
Dept: PULMONOLOGY | Facility: HOSPITAL | Age: 35
End: 2025-01-16
Attending: FAMILY MEDICINE
Payer: COMMERCIAL

## 2025-01-16 DIAGNOSIS — J45.40 MODERATE PERSISTENT ASTHMA WITHOUT COMPLICATION: ICD-10-CM

## 2025-01-16 PROCEDURE — 94729 DIFFUSING CAPACITY: CPT | Performed by: INTERNAL MEDICINE

## 2025-01-16 PROCEDURE — 94726 PLETHYSMOGRAPHY LUNG VOLUMES: CPT

## 2025-01-16 PROCEDURE — 94060 EVALUATION OF WHEEZING: CPT | Performed by: INTERNAL MEDICINE

## 2025-01-16 PROCEDURE — 94726 PLETHYSMOGRAPHY LUNG VOLUMES: CPT | Performed by: INTERNAL MEDICINE

## 2025-01-16 PROCEDURE — 94729 DIFFUSING CAPACITY: CPT

## 2025-01-16 PROCEDURE — 94060 EVALUATION OF WHEEZING: CPT

## 2025-01-16 PROCEDURE — 94760 N-INVAS EAR/PLS OXIMETRY 1: CPT

## 2025-01-16 RX ADMIN — ALBUTEROL SULFATE 2.5 MG: 2.5 SOLUTION RESPIRATORY (INHALATION) at 07:43

## 2025-01-18 ENCOUNTER — RESULTS FOLLOW-UP (OUTPATIENT)
Age: 35
End: 2025-01-18

## 2025-04-17 ENCOUNTER — OFFICE VISIT (OUTPATIENT)
Age: 35
End: 2025-04-17
Payer: COMMERCIAL

## 2025-04-17 VITALS
BODY MASS INDEX: 23.41 KG/M2 | OXYGEN SATURATION: 99 % | HEART RATE: 70 BPM | WEIGHT: 124 LBS | HEIGHT: 61 IN | TEMPERATURE: 98 F

## 2025-04-17 DIAGNOSIS — J30.1 ALLERGIC RHINITIS DUE TO POLLEN, UNSPECIFIED SEASONALITY: ICD-10-CM

## 2025-04-17 DIAGNOSIS — J45.40 MODERATE PERSISTENT ASTHMA WITHOUT COMPLICATION: ICD-10-CM

## 2025-04-17 DIAGNOSIS — J45.40 MODERATE PERSISTENT ASTHMA WITHOUT COMPLICATION: Primary | ICD-10-CM

## 2025-04-17 PROCEDURE — 99214 OFFICE O/P EST MOD 30 MIN: CPT | Performed by: FAMILY MEDICINE

## 2025-04-17 RX ORDER — FLUTICASONE FUROATE AND VILANTEROL 100; 25 UG/1; UG/1
1 POWDER RESPIRATORY (INHALATION) DAILY
Qty: 180 BLISTER | Refills: 1 | Status: SHIPPED | OUTPATIENT
Start: 2025-04-17 | End: 2026-04-12

## 2025-04-17 RX ORDER — LORATADINE 10 MG/1
10 TABLET ORAL DAILY
Qty: 90 TABLET | Refills: 3 | Status: SHIPPED | OUTPATIENT
Start: 2025-04-17

## 2025-04-17 NOTE — PROGRESS NOTES
Name: Nasrin Montes      : 1990      MRN: [id number]  Encounter Provider: Rafal Flores DO  Encounter Date: 2025   Encounter department: [facility]  :  Assessment & Plan  Moderate persistent asthma without complication  Stable at this time.  PFTs normal with Breo on board.  Patient will try reducing Breo to the 100/25 inhaler daily.  Patient will call if symptoms worsen.  Patient has been asymptomatic.  Albuterol as needed.  Orders:    Fluticasone Furoate-Vilanterol 100-25 mcg/actuation inhaler; Inhale 1 puff daily Rinse mouth after use.    Allergic rhinitis due to pollen, unspecified seasonality  Stable.  Continue Claritin daily.  Orders:    loratadine (CLARITIN) 10 mg tablet; Take 1 tablet (10 mg total) by mouth daily    Moderate persistent asthma without complication    Orders:    Fluticasone Furoate-Vilanterol 100-25 mcg/actuation inhaler; Inhale 1 puff daily Rinse mouth after use.    Allergic rhinitis due to pollen, unspecified seasonality    Orders:    loratadine (CLARITIN) 10 mg tablet; Take 1 tablet (10 mg total) by mouth daily           History of Present Illness   Patient is here to follow-up on moderate persistent asthma, allergies.  Patient doing very well at this time.  No wheezing or shortness of breath or chest pain.  Allergy symptoms controlled with Claritin.  No fevers noted.  No nocturnal symptoms.  Patient without any exertional issues at this time.  No albuterol use.  Patient did have PFTs done with Breo on board which was normal.    Asthma  Her past medical history is significant for asthma.     Review of Systems   Constitutional: Negative.    HENT: Negative.     Eyes: Negative.    Respiratory: Negative.     Cardiovascular: Negative.    Gastrointestinal: Negative.    Endocrine: Negative.    Genitourinary: Negative.    Musculoskeletal: Negative.    Skin: Negative.    Allergic/Immunologic: Negative.    Neurological: Negative.    Hematological:  "Negative.    Psychiatric/Behavioral: Negative.         Objective   Pulse 70   Temp 98 °F (36.7 °C)   Ht 5' 1\" (1.549 m)   Wt 56.2 kg (124 lb)   LMP 03/30/2025 (Exact Date)   SpO2 99%   BMI 23.43 kg/m²      Physical Exam  Vitals and nursing note reviewed.   Constitutional:       General: She is not in acute distress.     Appearance: Normal appearance. She is well-developed. She is not ill-appearing, toxic-appearing or diaphoretic.   HENT:      Head: Normocephalic and atraumatic.      Right Ear: Tympanic membrane, ear canal and external ear normal. There is no impacted cerumen.      Left Ear: Tympanic membrane, ear canal and external ear normal.      Nose: Nose normal. No congestion or rhinorrhea.      Mouth/Throat:      Mouth: Mucous membranes are moist.      Pharynx: No oropharyngeal exudate or posterior oropharyngeal erythema.   Eyes:      General:         Right eye: No discharge.         Left eye: No discharge.   Neck:      Thyroid: No thyromegaly.      Vascular: No carotid bruit.      Trachea: No tracheal deviation.   Cardiovascular:      Rate and Rhythm: Normal rate and regular rhythm.      Pulses: Normal pulses.      Heart sounds: Normal heart sounds. No murmur heard.     No gallop.   Pulmonary:      Effort: Pulmonary effort is normal. No respiratory distress.      Breath sounds: Normal breath sounds. No stridor. No wheezing or rales.   Chest:      Chest wall: No tenderness.   Musculoskeletal:         General: No tenderness or deformity. Normal range of motion.      Cervical back: Normal range of motion and neck supple.   Lymphadenopathy:      Cervical: No cervical adenopathy.   Skin:     General: Skin is warm and dry.      Capillary Refill: Capillary refill takes less than 2 seconds.      Coloration: Skin is not pale.      Findings: No erythema or rash.   Neurological:      Mental Status: She is alert and oriented to person, place, and time. Mental status is at baseline.      Cranial Nerves: No cranial " nerve deficit.      Motor: No abnormal muscle tone.      Coordination: Coordination normal.      Deep Tendon Reflexes: Reflexes normal.   Psychiatric:         Mood and Affect: Mood normal.         Behavior: Behavior normal.         Thought Content: Thought content normal.         Judgment: Judgment normal.

## 2025-04-17 NOTE — ASSESSMENT & PLAN NOTE
Stable.  Continue Claritin daily.  Orders:    loratadine (CLARITIN) 10 mg tablet; Take 1 tablet (10 mg total) by mouth daily

## 2025-04-17 NOTE — ASSESSMENT & PLAN NOTE
Stable at this time.  PFTs normal with Breo on board.  Patient will try reducing Breo to the 100/25 inhaler daily.  Patient will call if symptoms worsen.  Patient has been asymptomatic.  Albuterol as needed.  Orders:    Fluticasone Furoate-Vilanterol 100-25 mcg/actuation inhaler; Inhale 1 puff daily Rinse mouth after use.

## 2025-04-17 NOTE — ASSESSMENT & PLAN NOTE
Orders:    Fluticasone Furoate-Vilanterol 100-25 mcg/actuation inhaler; Inhale 1 puff daily Rinse mouth after use.